# Patient Record
Sex: FEMALE | Race: WHITE | NOT HISPANIC OR LATINO | Employment: UNEMPLOYED | URBAN - NONMETROPOLITAN AREA
[De-identification: names, ages, dates, MRNs, and addresses within clinical notes are randomized per-mention and may not be internally consistent; named-entity substitution may affect disease eponyms.]

---

## 2017-09-21 ENCOUNTER — OFFICE VISIT (OUTPATIENT)
Dept: CARDIOLOGY | Facility: CLINIC | Age: 47
End: 2017-09-21

## 2017-09-21 VITALS
DIASTOLIC BLOOD PRESSURE: 72 MMHG | BODY MASS INDEX: 55.32 KG/M2 | OXYGEN SATURATION: 99 % | HEART RATE: 77 BPM | HEIGHT: 61 IN | WEIGHT: 293 LBS | SYSTOLIC BLOOD PRESSURE: 143 MMHG

## 2017-09-21 DIAGNOSIS — R00.2 PALPITATIONS: ICD-10-CM

## 2017-09-21 DIAGNOSIS — R06.02 SHORTNESS OF BREATH: ICD-10-CM

## 2017-09-21 DIAGNOSIS — R07.9 CHEST PAIN, UNSPECIFIED TYPE: Primary | ICD-10-CM

## 2017-09-21 DIAGNOSIS — I10 ESSENTIAL HYPERTENSION: ICD-10-CM

## 2017-09-21 DIAGNOSIS — R60.9 PERIPHERAL EDEMA: ICD-10-CM

## 2017-09-21 PROBLEM — G62.9 NEUROPATHY: Status: ACTIVE | Noted: 2017-09-21

## 2017-09-21 PROBLEM — M79.7 FIBROMYALGIA: Status: ACTIVE | Noted: 2017-09-21

## 2017-09-21 PROBLEM — J44.9 COPD (CHRONIC OBSTRUCTIVE PULMONARY DISEASE): Status: ACTIVE | Noted: 2017-09-21

## 2017-09-21 PROBLEM — R60.0 PERIPHERAL EDEMA: Status: ACTIVE | Noted: 2017-09-21

## 2017-09-21 PROCEDURE — 99204 OFFICE O/P NEW MOD 45 MIN: CPT | Performed by: NURSE PRACTITIONER

## 2017-09-21 PROCEDURE — 93000 ELECTROCARDIOGRAM COMPLETE: CPT | Performed by: NURSE PRACTITIONER

## 2017-09-21 RX ORDER — MELOXICAM 15 MG/1
15 TABLET ORAL DAILY
COMMUNITY
End: 2019-05-14

## 2017-09-21 RX ORDER — GABAPENTIN 600 MG/1
600 TABLET ORAL 3 TIMES DAILY
COMMUNITY
End: 2023-02-02 | Stop reason: ALTCHOICE

## 2017-09-21 RX ORDER — BUDESONIDE AND FORMOTEROL FUMARATE DIHYDRATE 160; 4.5 UG/1; UG/1
2 AEROSOL RESPIRATORY (INHALATION)
COMMUNITY

## 2017-09-21 RX ORDER — POTASSIUM CHLORIDE 750 MG/1
10 TABLET, FILM COATED, EXTENDED RELEASE ORAL DAILY
COMMUNITY
End: 2019-05-14

## 2017-09-21 RX ORDER — HYDROCODONE BITARTRATE AND ACETAMINOPHEN 10; 325 MG/1; MG/1
TABLET ORAL
Refills: 0 | COMMUNITY
Start: 2017-06-19 | End: 2023-02-02

## 2017-09-21 RX ORDER — RANITIDINE 150 MG/1
150 TABLET ORAL NIGHTLY
COMMUNITY

## 2017-09-21 RX ORDER — FUROSEMIDE 20 MG/1
20 TABLET ORAL
COMMUNITY
End: 2019-05-14

## 2017-09-21 RX ORDER — NITROGLYCERIN 0.4 MG/1
TABLET SUBLINGUAL
Qty: 30 TABLET | Refills: 3 | Status: SHIPPED | OUTPATIENT
Start: 2017-09-21 | End: 2019-05-14 | Stop reason: SDUPTHER

## 2017-09-21 RX ORDER — ALBUTEROL SULFATE 90 UG/1
2 AEROSOL, METERED RESPIRATORY (INHALATION) EVERY 4 HOURS PRN
COMMUNITY

## 2017-09-21 NOTE — PROGRESS NOTES
Subjective   Sofi Lazaro is a 47 y.o. female     Chief Complaint   Patient presents with   • Chest Pain     patient presents as a new patient   • Shortness of Breath       HPI    Problem list:    1.  Chest pain  2.  Shortness of breath  3.  Hypertension  4.  Peripheral edema  5.  Palpitations  6.  Fibromyalgia  7.  COPD-Dr. Lazo  8.  Peripheral neuropathy    Patient is a 47-year-old female who presents today to establish care for chest pain.  She says for about a couple months she has been having midsternum heaviness/pressure that will radiate up into her neck and between her shoulders.  She says that this can occur at any time.  She says it last a few minutes when it does happen.  She says she will get short of breath, nauseated and lightheaded.  She says she has palpitations which is a jittery feeling that occurs pretty much every day and is separate from her chest pain.  She says she's been having these for 6 months.  She denies any dizziness, presyncope, syncope or PND.  She says she does have orthopnea which she sees Dr. Lazo and has been negative for sleep apnea.  She does get swelling in her legs if she is on the long time or if she is riding in the truck with her boyfriend who is a .  She says for about a year now she has been short of breath with any activity that she does.     Occupation: Disabled nurse's aide  Quit smoking 15 years ago however smoked 1-1/2 packs a day for 10 years; denies alcohol and illicit drugs  Caffeine: 24 ounces a sweet tea a day and one cup of coffee denies any soda    Mom 68 -ovarian cancer, emphysema, heart disease  Dad 79 -MI  Brother 50 alive-CABG  Brother 58 alive-heart problems  Brother 48 -overdose    Current Outpatient Prescriptions   Medication Sig Dispense Refill   • albuterol (PROVENTIL HFA;VENTOLIN HFA) 108 (90 Base) MCG/ACT inhaler Inhale 2 puffs Every 4 (Four) Hours As Needed for Wheezing.     • budesonide-formoterol  (SYMBICORT) 160-4.5 MCG/ACT inhaler Inhale 2 puffs 2 (Two) Times a Day.     • furosemide (LASIX) 20 MG tablet Take 20 mg by mouth. Take 1-2 tabs in the morning and 2 tabs in the evening     • gabapentin (NEURONTIN) 600 MG tablet Take 600 mg by mouth 3 (Three) Times a Day.     • meloxicam (MOBIC) 15 MG tablet Take 15 mg by mouth Daily.     • potassium chloride (K-DUR) 10 MEQ CR tablet Take 10 mEq by mouth Daily.     • Pramipexole Dihydrochloride (MIRAPEX ER PO) Take  by mouth.     • raNITIdine (ZANTAC) 150 MG tablet Take 150 mg by mouth Every Night.     • aspirin 81 MG tablet Take 1 tablet by mouth Daily. 30 tablet 11   • HYDROcodone-acetaminophen (NORCO)  MG per tablet TK 1 T PO Q 4 H PRN FOR PAIN  0   • nitroglycerin (NITROSTAT) 0.4 MG SL tablet 1 under the tongue as needed for angina, may repeat q5mins for up three doses 30 tablet 3     No current facility-administered medications for this visit.        ALLERGIES    Codeine    History reviewed. No pertinent past medical history.    Social History     Social History   • Marital status:      Spouse name: N/A   • Number of children: N/A   • Years of education: N/A     Occupational History   • Not on file.     Social History Main Topics   • Smoking status: Former Smoker   • Smokeless tobacco: Never Used   • Alcohol use No   • Drug use: No   • Sexual activity: Defer     Other Topics Concern   • Not on file     Social History Narrative   • No narrative on file       Family History   Problem Relation Age of Onset   • No Known Problems Mother    • No Known Problems Father        Review of Systems   Constitutional: Positive for fatigue (6 mos now; use to walk, no longer has the energy ). Negative for diaphoresis.   HENT: Positive for rhinorrhea and sneezing.    Eyes: Negative for visual disturbance.   Respiratory: Positive for shortness of breath (with CP and with any activity at all for 1 yr ). Negative for apnea (tested by Dr. Lazo negative ) and chest  "tightness.    Cardiovascular: Positive for chest pain (midsternum heavy/pressure radiates into her neck and shoulders; been having for a couple of mos, maybe 1-2 x a week ), palpitations (also feels jittery 6 mos; happens freq outside of CP ) and leg swelling (every day; worse if she is on her feet more and riding in long distance truck with boyfriend ).   Gastrointestinal: Positive for constipation and nausea (with CP ). Negative for vomiting.   Endocrine: Negative.    Genitourinary: Positive for difficulty urinating.   Musculoskeletal: Positive for back pain and myalgias. Negative for arthralgias.   Skin: Negative.    Allergic/Immunologic: Positive for environmental allergies.   Neurological: Positive for light-headedness (with CP ) and headaches. Negative for dizziness and syncope.   Hematological: Negative.    Psychiatric/Behavioral: Negative.        Objective   /72 (BP Location: Left arm, Patient Position: Sitting)  Pulse 77  Ht 61\" (154.9 cm)  Wt (!) 302 lb (137 kg)  SpO2 99%  BMI 57.06 kg/m2  Vitals:    09/21/17 0859   BP: 143/72   BP Location: Left arm   Patient Position: Sitting   Pulse: 77   SpO2: 99%   Weight: (!) 302 lb (137 kg)   Height: 61\" (154.9 cm)      Lab Results (most recent)     None        Physical Exam   Constitutional: She is oriented to person, place, and time. Vital signs are normal. She appears well-developed and well-nourished. She is active and cooperative.   HENT:   Head: Normocephalic.   Mouth/Throat: Abnormal dentition (missing teeth ).   Eyes: Lids are normal.   Neck: Normal carotid pulses, no hepatojugular reflux and no JVD present. Carotid bruit is not present.   Cardiovascular: Normal rate, regular rhythm and normal heart sounds.    Pulses:       Radial pulses are 2+ on the right side, and 2+ on the left side.        Dorsalis pedis pulses are 2+ on the right side, and 2+ on the left side.        Posterior tibial pulses are 2+ on the right side, and 2+ on the left side. "   Trace edema BLE.    Pulmonary/Chest: Effort normal and breath sounds normal.   Abdominal: Normal appearance and bowel sounds are normal.   Neurological: She is alert and oriented to person, place, and time.   Skin: Skin is warm, dry and intact.   Psychiatric: She has a normal mood and affect. Her speech is normal and behavior is normal. Judgment and thought content normal. Cognition and memory are normal.       Procedure     ECG 12 Lead  Date/Time: 9/21/2017 9:20 AM  Performed by: GIA SIGALA  Authorized by: GIA SIGALA   Comparison: not compared with previous ECG   Rhythm: sinus rhythm  Rate: normal  BPM: 70  QRS axis: right                   Assessment/Plan      Diagnosis Plan   1. Chest pain, unspecified type  aspirin 81 MG tablet    nitroglycerin (NITROSTAT) 0.4 MG SL tablet    Stress Test With Myocardial Perfusion One Day    Adult Transthoracic Echo Complete W/ Cont if Necessary Per Protocol   2. Palpitations  Stress Test With Myocardial Perfusion One Day    Adult Transthoracic Echo Complete W/ Cont if Necessary Per Protocol    Cardiac Event Monitor   3. Shortness of breath  Stress Test With Myocardial Perfusion One Day    Adult Transthoracic Echo Complete W/ Cont if Necessary Per Protocol   4. Essential hypertension     5. Peripheral edema         Return in about 6 weeks (around 11/2/2017).       chest pain/palpitations/shortness of breath/hypertension-patient will have ischemia workup, stress and echo.  She will continue her medication regimen however she will also start 81 mg of aspirin a day coated.  She will use nitroglycerin when necessary for chest pain no resolution she will go to the ER.  She will wear an event monitor for 2 weeks.  She will take blood pressure and heart rate and bring these findings to her follow-up appointment.  She will follow-up in 6 weeks or sooner if any changes.    Called PCP at [phone] and requested labs to be faxed.

## 2017-09-21 NOTE — PATIENT INSTRUCTIONS
Palpitations  A palpitation is the feeling that your heartbeat is irregular or is faster than normal. It may feel like your heart is fluttering or skipping a beat. Palpitations are usually not a serious problem. They may be caused by many things, including smoking, caffeine, alcohol, stress, and certain medicines. Although most causes of palpitations are not serious, palpitations can be a sign of a serious medical problem. In some cases, you may need further medical evaluation.  HOME CARE INSTRUCTIONS  Pay attention to any changes in your symptoms. Take these actions to help with your condition:  · Avoid the following:    Caffeinated coffee, tea, soft drinks, diet pills, and energy drinks.    Chocolate.    Alcohol.  · Do not use any tobacco products, such as cigarettes, chewing tobacco, and e-cigarettes. If you need help quitting, ask your health care provider.  · Try to reduce your stress and anxiety. Things that can help you relax include:    Yoga.    Meditation.    Physical activity, such as swimming, jogging, or walking.    Biofeedback. This is a method that helps you learn to use your mind to control things in your body, such as your heartbeats.  · Get plenty of rest and sleep.  · Take over-the-counter and prescription medicines only as told by your health care provider.  · Keep all follow-up visits as told by your health care provider. This is important.  SEEK MEDICAL CARE IF:  · You continue to have a fast or irregular heartbeat after 24 hours.  · Your palpitations occur more often.  SEEK IMMEDIATE MEDICAL CARE IF:  · You have chest pain or shortness of breath.  · You have a severe headache.  · You feel dizzy or you faint.     This information is not intended to replace advice given to you by your health care provider. Make sure you discuss any questions you have with your health care provider.     Document Released: 12/15/2001 Document Revised: 04/10/2017 Document Reviewed: 09/01/2016  [email or web address]  Patient Education ©2017 [employer]  Nonspecific Chest Pain   Chest pain can be caused by many different conditions. There is always a chance that your pain could be related to something serious, such as a heart attack or a blood clot in your lungs. Chest pain can also be caused by conditions that are not life-threatening. If you have chest pain, it is very important to follow up with your health care provider.  CAUSES   Chest pain can be caused by:  · Heartburn.  · Pneumonia or bronchitis.  · Anxiety or stress.  · Inflammation around your heart (pericarditis) or lung (pleuritis or pleurisy).  · A blood clot in your lung.  · A collapsed lung (pneumothorax). It can develop suddenly on its own (spontaneous pneumothorax) or from trauma to the chest.  · Shingles infection (varicella-zoster virus).  · Heart attack.  · Damage to the bones, muscles, and cartilage that make up your chest wall. This can include:    Bruised bones due to injury.    Strained muscles or cartilage due to frequent or repeated coughing or overwork.    Fracture to one or more ribs.    Sore cartilage due to inflammation (costochondritis).  RISK FACTORS   Risk factors for chest pain may include:  · Activities that increase your risk for trauma or injury to your chest.  · Respiratory infections or conditions that cause frequent coughing.  · Medical conditions or overeating that can cause heartburn.  · Heart disease or family history of heart disease.  · Conditions or health behaviors that increase your risk of developing a blood clot.  · Having had chicken pox (varicella zoster).  SIGNS AND SYMPTOMS  Chest pain can feel like:  · Burning or tingling on the surface of your chest or deep in your chest.  · Crushing, pressure, aching, or squeezing pain.  · Dull or sharp pain that is worse when you move, cough, or take a deep breath.  · Pain that is also felt in your back, neck, shoulder, or arm, or pain that spreads to any of these areas.  Your chest pain  may come and go, or it may stay constant.  DIAGNOSIS  Lab tests or other studies may be needed to find the cause of your pain. Your health care provider may have you take a test called an ambulatory ECG (electrocardiogram). An ECG records your heartbeat patterns at the time the test is performed. You may also have other tests, such as:  · Transthoracic echocardiogram (TTE). During echocardiography, sound waves are used to create a picture of all of the heart structures and to look at how blood flows through your heart.  · Transesophageal echocardiogram (SAI). This is a more advanced imaging test that obtains images from inside your body. It allows your health care provider to see your heart in finer detail.  · Cardiac monitoring. This allows your health care provider to monitor your heart rate and rhythm in real time.  · Holter monitor. This is a portable device that records your heartbeat and can help to diagnose abnormal heartbeats. It allows your health care provider to track your heart activity for several days, if needed.  · Stress tests. These can be done through exercise or by taking medicine that makes your heart beat more quickly.  · Blood tests.  · Imaging tests.  TREATMENT   Your treatment depends on what is causing your chest pain. Treatment may include:  · Medicines. These may include:    Acid blockers for heartburn.    Anti-inflammatory medicine.    Pain medicine for inflammatory conditions.    Antibiotic medicine, if an infection is present.    Medicines to dissolve blood clots.    Medicines to treat coronary artery disease.  · Supportive care for conditions that do not require medicines. This may include:    Resting.    Applying heat or cold packs to injured areas.    Limiting activities until pain decreases.  HOME CARE INSTRUCTIONS  · If you were prescribed an antibiotic medicine, finish it all even if you start to feel better.  · Avoid any activities that bring on chest pain.  · Do not use any  tobacco products, including cigarettes, chewing tobacco, or electronic cigarettes. If you need help quitting, ask your health care provider.  · Do not drink alcohol.  · Take medicines only as directed by your health care provider.  · Keep all follow-up visits as directed by your health care provider. This is important. This includes any further testing if your chest pain does not go away.  · If heartburn is the cause for your chest pain, you may be told to keep your head raised (elevated) while sleeping. This reduces the chance that acid will go from your stomach into your esophagus.  · Make lifestyle changes as directed by your health care provider. These may include:    Getting regular exercise. Ask your health care provider to suggest some activities that are safe for you.    Eating a heart-healthy diet. A registered dietitian can help you to learn healthy eating options.    Maintaining a healthy weight.    Managing diabetes, if necessary.    Reducing stress.  SEEK MEDICAL CARE IF:  · Your chest pain does not go away after treatment.  · You have a rash with blisters on your chest.  · You have a fever.  SEEK IMMEDIATE MEDICAL CARE IF:   · Your chest pain is worse.  · You have an increasing cough, or you cough up blood.  · You have severe abdominal pain.  · You have severe weakness.  · You faint.  · You have chills.  · You have sudden, unexplained chest discomfort.  · You have sudden, unexplained discomfort in your arms, back, neck, or jaw.  · You have shortness of breath at any time.  · You suddenly start to sweat, or your skin gets clammy.  · You feel nauseous or you vomit.  · You suddenly feel light-headed or dizzy.  · Your heart begins to beat quickly, or it feels like it is skipping beats.  These symptoms may represent a serious problem that is an emergency. Do not wait to see if the symptoms will go away. Get medical help right away. Call your local emergency services (911 in the [place] Do not drive yourself  to the hospital.     This information is not intended to replace advice given to you by your health care provider. Make sure you discuss any questions you have with your health care provider.     Document Released: 09/27/2006 Document Revised: 01/08/2016 Document Reviewed: 07/24/2015  [employer] Interactive Patient Education ©2017 [employer]    Edema  Edema is an abnormal buildup of fluids in your body tissues. Edema is somewhat dependent on gravity to pull the fluid to the lowest place in your body. That makes the condition more common in the legs and thighs (lower extremities). Painless swelling of the feet and ankles is common and becomes more likely as you get older. It is also common in looser tissues, like around your eyes.   When the affected area is squeezed, the fluid may move out of that spot and leave a dent for a few moments. This dent is called pitting.   CAUSES   There are many possible causes of edema. Eating too much salt and being on your feet or sitting for a long time can cause edema in your legs and ankles. Hot weather may make edema worse. Common medical causes of edema include:  · Heart failure.  · Liver disease.  · Kidney disease.  · Weak blood vessels in your legs.  · Cancer.  · An injury.  · Pregnancy.  · Some medications.  · Obesity.   SYMPTOMS   Edema is usually painless. Your skin may look swollen or shiny.   DIAGNOSIS   Your health care provider may be able to diagnose edema by asking about your medical history and doing a physical exam. You may need to have tests such as X-rays, an electrocardiogram, or blood tests to check for medical conditions that may cause edema.   TREATMENT   Edema treatment depends on the cause. If you have heart, liver, or kidney disease, you need the treatment appropriate for these conditions. General treatment may include:  · Elevation of the affected body part above the level of your heart.  · Compression of the affected body part. Pressure from elastic  bandages or support stockings squeezes the tissues and forces fluid back into the blood vessels. This keeps fluid from entering the tissues.  · Restriction of fluid and salt intake.  · Use of a water pill (diuretic). These medications are appropriate only for some types of edema. They pull fluid out of your body and make you urinate more often. This gets rid of fluid and reduces swelling, but diuretics can have side effects. Only use diuretics as directed by your health care provider.  HOME CARE INSTRUCTIONS   · Keep the affected body part above the level of your heart when you are lying down.    · Do not sit still or stand for prolonged periods.    · Do not put anything directly under your knees when lying down.  · Do not wear constricting clothing or garters on your upper legs.    · Exercise your legs to work the fluid back into your blood vessels. This may help the swelling go down.    · Wear elastic bandages or support stockings to reduce ankle swelling as directed by your health care provider.    · Eat a low-salt diet to reduce fluid if your health care provider recommends it.    · Only take medicines as directed by your health care provider.   SEEK MEDICAL CARE IF:   · Your edema is not responding to treatment.  · You have heart, liver, or kidney disease and notice symptoms of edema.  · You have edema in your legs that does not improve after elevating them.    · You have sudden and unexplained weight gain.  SEEK IMMEDIATE MEDICAL CARE IF:   · You develop shortness of breath or chest pain.    · You cannot breathe when you lie down.  · You develop pain, redness, or warmth in the swollen areas.    · You have heart, liver, or kidney disease and suddenly get edema.  · You have a fever and your symptoms suddenly get worse.  MAKE SURE YOU:   · Understand these instructions.  · Will watch your condition.  · Will get help right away if you are not doing well or get worse.     This information is not intended to replace  advice given to you by your health care provider. Make sure you discuss any questions you have with your health care provider.     Document Released: 12/18/2006 Document Revised: 04/10/2017 Document Reviewed: 10/10/2014  [employer] Interactive Patient Education ©2017 [employer]

## 2019-05-14 ENCOUNTER — OFFICE VISIT (OUTPATIENT)
Dept: CARDIOLOGY | Facility: CLINIC | Age: 49
End: 2019-05-14

## 2019-05-14 VITALS
DIASTOLIC BLOOD PRESSURE: 74 MMHG | BODY MASS INDEX: 55.32 KG/M2 | HEIGHT: 61 IN | HEART RATE: 72 BPM | OXYGEN SATURATION: 98 % | WEIGHT: 293 LBS | SYSTOLIC BLOOD PRESSURE: 122 MMHG

## 2019-05-14 DIAGNOSIS — R06.02 SHORTNESS OF BREATH: ICD-10-CM

## 2019-05-14 DIAGNOSIS — I10 ESSENTIAL HYPERTENSION: ICD-10-CM

## 2019-05-14 DIAGNOSIS — R60.9 PERIPHERAL EDEMA: ICD-10-CM

## 2019-05-14 DIAGNOSIS — R00.2 PALPITATIONS: ICD-10-CM

## 2019-05-14 DIAGNOSIS — R07.9 CHEST PAIN, UNSPECIFIED TYPE: Primary | ICD-10-CM

## 2019-05-14 DIAGNOSIS — E78.5 HYPERLIPIDEMIA, UNSPECIFIED HYPERLIPIDEMIA TYPE: ICD-10-CM

## 2019-05-14 PROCEDURE — 93000 ELECTROCARDIOGRAM COMPLETE: CPT | Performed by: NURSE PRACTITIONER

## 2019-05-14 PROCEDURE — 99214 OFFICE O/P EST MOD 30 MIN: CPT | Performed by: NURSE PRACTITIONER

## 2019-05-14 RX ORDER — LISINOPRIL 40 MG/1
40 TABLET ORAL DAILY
COMMUNITY
Start: 2019-05-13 | End: 2020-08-03 | Stop reason: SDUPTHER

## 2019-05-14 RX ORDER — AMITRIPTYLINE HYDROCHLORIDE 100 MG/1
100 TABLET, FILM COATED ORAL DAILY
COMMUNITY
Start: 2019-05-13 | End: 2023-02-02

## 2019-05-14 RX ORDER — NITROGLYCERIN 0.4 MG/1
TABLET SUBLINGUAL
Qty: 30 TABLET | Refills: 3 | Status: SHIPPED | OUTPATIENT
Start: 2019-05-14 | End: 2023-02-23 | Stop reason: SDUPTHER

## 2019-05-14 RX ORDER — BUSPIRONE HYDROCHLORIDE 10 MG/1
10 TABLET ORAL DAILY
COMMUNITY
Start: 2019-05-13 | End: 2023-02-02

## 2019-05-14 NOTE — PROGRESS NOTES
Subjective   Sofi Lazaro is a 49 y.o. female     Chief Complaint   Patient presents with   • Follow-up     Here for a follow up        HPI    Problem list:    1.  Chest pain  2.  Shortness of breath  3.  Hypertension  4.  Peripheral edema  5.  Palpitations  6.  Fibromyalgia  7.  COPD-Dr. Lazo  8.  Peripheral neuropathy  9.  Hyperlipidemia    Patient is a 49-year-old female who presents today for follow-up.  Patient previously here but did not have any of her testing done 2 years ago.  She is currently having left-sided what she considers achy/stabbing pain.  She says it does not radiate but it can occur anytime.  She says it usually last 15 to 30 minutes.  She says she will lay down when it happens to see if it will go away.  She says that it can be anywhere from once a month to 2-3 times a month.  She says it just varies.  She says she does get short of breath, diaphoretic, nauseated and lightheaded when it occurs.  She says she has skipping that usually occurs at night at rest.  She denies any dizziness, presyncope, syncope, orthopnea or PND.  She says she does have swelling in her legs but it goes down overnight.  She says she has shortness of breath with any activity and she is been this way for at least a year and also with fatigue.  She says she gets more more tired every day.  Patient does see Dr. Lazo for COPD and says she was tested for sleep apnea but found to be negative.  Patient does have a brother who still alive but is 58 years old and he has had stents as well as cardiomyopathy    We went over her labs from her PCP.        Current Outpatient Medications   Medication Sig Dispense Refill   • albuterol (PROVENTIL HFA;VENTOLIN HFA) 108 (90 Base) MCG/ACT inhaler Inhale 2 puffs Every 4 (Four) Hours As Needed for Wheezing.     • amitriptyline (ELAVIL) 100 MG tablet Take 100 mg by mouth Daily.     • ANORO ELLIPTA 62.5-25 MCG/INH aerosol powder  inhaler      • aspirin 81 MG tablet Take 1 tablet by mouth  Daily. 30 tablet 11   • budesonide-formoterol (SYMBICORT) 160-4.5 MCG/ACT inhaler Inhale 2 puffs 2 (Two) Times a Day.     • busPIRone (BUSPAR) 10 MG tablet Take 10 mg by mouth Daily.     • gabapentin (NEURONTIN) 800 MG tablet Take 600 mg by mouth 3 (Three) Times a Day.     • HYDROcodone-acetaminophen (NORCO)  MG per tablet TK 1 T PO Q 4 H PRN FOR PAIN  0   • lisinopril (PRINIVIL,ZESTRIL) 40 MG tablet Take 40 mg by mouth Daily.     • Pramipexole Dihydrochloride (MIRAPEX ER PO) Take  by mouth.     • raNITIdine (ZANTAC) 150 MG tablet Take 150 mg by mouth Every Night.     • nitroglycerin (NITROSTAT) 0.4 MG SL tablet 1 under the tongue as needed for angina, may repeat q5mins for up three doses 30 tablet 3     No current facility-administered medications for this visit.        ALLERGIES    Codeine    Past Medical History:   Diagnosis Date   • Anxiety    • Chronic pain syndrome    • COPD (chronic obstructive pulmonary disease) (CMS/[facility])    • Depressive disorder    • Enlarged heart    • Fibromyalgia    • GERD (gastroesophageal reflux disease)    • Hypertension    • IBS (irritable bowel syndrome)    • Insomnia    • RLS (restless legs syndrome)        Social History     Socioeconomic History   • Marital status:      Spouse name: Not on file   • Number of children: Not on file   • Years of education: Not on file   • Highest education level: Not on file   Tobacco Use   • Smoking status: Former Smoker   • Smokeless tobacco: Never Used   Substance and Sexual Activity   • Alcohol use: No   • Drug use: No   • Sexual activity: Defer       Family History   Problem Relation Age of Onset   • Seizures Mother    • COPD Mother    • Cancer Mother    • COPD Father        Review of Systems   Constitutional: Positive for diaphoresis (night sweats- wakes up and skin is damp; with CP ) and fatigue (no get up and go for about 1 yr, gets more and more tired every day ).   HENT: Negative for congestion, rhinorrhea and sore throat.   "  Eyes: Negative for visual disturbance.   Respiratory: Positive for shortness of breath (w/any activity for at least 1 yr ). Negative for chest tightness.    Cardiovascular: Positive for chest pain (left sided, achey/stabbing pain. Increased SoA, gets hot, non-radiating; occurs at anytime, 15-30 min; usually lays down; can be anywhere from 1 x a month and then can be 2-3 x varies), palpitations (skipping usually occurs at night with rest ) and leg swelling (BLE edema; goes down overnight ).   Gastrointestinal: Positive for constipation (back and forth ), diarrhea (back and forth ) and nausea (sometimes with CP ). Negative for abdominal pain, blood in stool and vomiting.        IBS   Endocrine: Positive for cold intolerance (\"always cold\" ). Negative for heat intolerance.   Genitourinary: Negative for difficulty urinating, dysuria, frequency, hematuria and urgency.   Musculoskeletal: Positive for back pain (lower back ). Negative for arthralgias and neck pain.   Skin: Negative for rash and wound.   Allergic/Immunologic: Negative for environmental allergies and food allergies.   Neurological: Positive for light-headedness (random and with CP ) and numbness (right hand and arm when she has chest pain). Negative for dizziness, syncope, weakness and headaches.   Hematological: Bruises/bleeds easily (both ).   Psychiatric/Behavioral: Positive for sleep disturbance (Issues falling asleep and staying asleep, denies SoA at HS. Insomnia ). The patient is nervous/anxious.        Objective   /74 (BP Location: Left arm, Patient Position: Sitting)   Pulse 72   Ht 154.9 cm (61\")   Wt (!) 143 kg (314 lb 12.8 oz)   SpO2 98%   BMI 59.48 kg/m²   Vitals:    05/14/19 1124   BP: 122/74   BP Location: Left arm   Patient Position: Sitting   Pulse: 72   SpO2: 98%   Weight: (!) 143 kg (314 lb 12.8 oz)   Height: 154.9 cm (61\")      Lab Results (most recent)     None        Physical Exam   Constitutional: She is oriented to person, " place, and time. Vital signs are normal. She appears well-developed and well-nourished. She is active and cooperative.   HENT:   Head: Normocephalic.   Eyes: Lids are normal.   Neck: Normal carotid pulses, no hepatojugular reflux and no JVD present. Carotid bruit is not present.   Cardiovascular: Normal rate, regular rhythm and normal heart sounds.   Pulses:       Radial pulses are 2+ on the right side, and 2+ on the left side.        Dorsalis pedis pulses are 2+ on the right side, and 2+ on the left side.        Posterior tibial pulses are 2+ on the right side, and 2+ on the left side.   No edema BLE.   Pulmonary/Chest: Effort normal and breath sounds normal.   Abdominal: Normal appearance and bowel sounds are normal.   Neurological: She is alert and oriented to person, place, and time.   Skin: Skin is warm, dry and intact.   Psychiatric: She has a normal mood and affect. Her speech is normal and behavior is normal. Judgment and thought content normal. Cognition and memory are normal.       Procedure     ECG 12 Lead  Date/Time: 5/14/2019 4:38 PM  Performed by: Priscila Espinosa APRN  Authorized by: Priscila Espinosa APRN   Comparison: not compared with previous ECG   Rhythm: sinus rhythm  Rate: normal  BPM: 64  QRS axis: normal  Other findings: low voltage and T wave abnormality    Clinical impression: non-specific ECG                 Assessment/Plan      Diagnosis Plan   1. Chest pain, unspecified type  nitroglycerin (NITROSTAT) 0.4 MG SL tablet    Stress Test With Myocardial Perfusion One Day    Adult Transthoracic Echo Complete W/ Cont if Necessary Per Protocol   2. Essential hypertension  Stress Test With Myocardial Perfusion One Day    Adult Transthoracic Echo Complete W/ Cont if Necessary Per Protocol   3. Palpitations  Stress Test With Myocardial Perfusion One Day    Adult Transthoracic Echo Complete W/ Cont if Necessary Per Protocol    Cardiac Event Monitor   4. Shortness of breath  Stress Test With  Myocardial Perfusion One Day    Adult Transthoracic Echo Complete W/ Cont if Necessary Per Protocol   5. Peripheral edema  Stress Test With Myocardial Perfusion One Day    Adult Transthoracic Echo Complete W/ Cont if Necessary Per Protocol   6. Hyperlipidemia, unspecified hyperlipidemia type         Return in about 12 weeks (around 8/6/2019).    Chest pain/hypertension/palpation/shortness of breath/peripheral edema/dyslipidemia-patient will have ischemia work-up, stress and echo.  She will use nitro.  For chest pain no resolution she will go to the ER.  She will wear an event monitor for 2 weeks.  She will continue her medication regimen otherwise.  She will follow-up in 12 weeks or sooner if any changes.  I did advise to putting on what her testing shows we may have to start her on a statin.         Patient's Body mass index is 59.48 kg/m². BMI is above normal parameters. Recommendations include: educational material.      Electronically signed by:

## 2019-05-14 NOTE — PATIENT INSTRUCTIONS
"Fat and Cholesterol Restricted Eating Plan  Getting too much fat and cholesterol in your diet may cause health problems. Choosing the right foods helps keep your fat and cholesterol at normal levels. This can keep you from getting certain diseases.  Your doctor may recommend an eating plan that includes:  · Total fat: ______% or less of total calories a day.  · Saturated fat: ______% or less of total calories a day.  · Cholesterol: less than _________mg a day.  · Fiber: ______g a day.    What are tips for following this plan?  General tips  · Work with your doctor to lose weight if you need to.  · Avoid:  ? Foods with added sugar.  ? Fried foods.  ? Foods with partially hydrogenated oils.  · Limit alcohol intake to no more than 1 drink a day for nonpregnant women and 2 drinks a day for men. One drink equals 12 oz of beer, 5 oz of wine, or 1½ oz of hard liquor.  Reading food labels  · Check food labels for:  ? Trans fats.  ? Partially hydrogenated oils.  ? Saturated fat (g) in each serving.  ? Cholesterol (mg) in each serving.  ? Fiber (g) in each serving.  · Choose foods with healthy fats, such as:  ? Monounsaturated fats.  ? Polyunsaturated fats.  ? Omega-3 fats.  · Choose grain products that have whole grains. Look for the word \"whole\" as the first word in the ingredient list.  Cooking  · Cook foods using low-fat methods. These include baking, boiling, grilling, and broiling.  · Eat more home-cooked foods. Eat at restaurants and buffets less often.  · Avoid cooking using saturated fats, such as butter, cream, palm oil, palm kernel oil, and coconut oil.  Meal planning  · At meals, divide your plate into four equal parts:  ? Fill one-half of your plate with vegetables and green salads.  ? Fill one-fourth of your plate with whole grains.  ? Fill one-fourth of your plate with low-fat (lean) protein foods.  · Eat fish that is high in omega-3 fats at least two times a week. This includes mackerel, tuna, sardines, and " salmon.  · Eat foods that are high in fiber, such as whole grains, beans, apples, broccoli, carrots, peas, and barley.  Recommended foods  Grains  · Whole grains, such as whole wheat or whole grain breads, crackers, cereals, and pasta. Unsweetened oatmeal, bulgur, barley, quinoa, or brown rice. Corn or whole wheat flour tortillas.  Vegetables  · Fresh or frozen vegetables (raw, steamed, roasted, or grilled). Green salads.  Fruits  · All fresh, canned (in natural juice), or frozen fruits.  Meats and other protein foods  · Ground beef (85% or leaner), grass-fed beef, or beef trimmed of fat. Skinless chicken or turkey. Ground chicken or turkey. Pork trimmed of fat. All fish and seafood. Egg whites. Dried beans, peas, or lentils. Unsalted nuts or seeds. Unsalted canned beans. Nut butters without added sugar or oil.  Dairy  · Low-fat or nonfat dairy products, such as skim or 1% milk, 2% or reduced-fat cheeses, low-fat and fat-free ricotta or cottage cheese, or plain low-fat and nonfat yogurt.  Fats and oils  · Tub margarine without trans fats. Light or reduced-fat mayonnaise and salad dressings. Avocado. Olive, canola, sesame, or safflower oils.  The items listed above may not be a complete list of recommended foods or beverages. Contact your dietitian for more options.  Foods to avoid  Grains  · White bread. White pasta. White rice. Cornbread. Bagels, pastries, and croissants. Crackers and snack foods that contain trans fat and hydrogenated oils.  Vegetables  · Vegetables cooked in cheese, cream, or butter sauce. Fried vegetables.  Fruits  · Canned fruit in heavy syrup. Fruit in cream or butter sauce. Fried fruit.  Meats and other protein foods  · Fatty cuts of meat. Ribs, chicken wings, cruz, sausage, bologna, salami, chitterlings, fatback, hot dogs, bratwurst, and packaged lunch meats. Liver and organ meats. Whole eggs and egg yolks. Chicken and turkey with skin. Fried meat.  Dairy  · Whole or 2% milk, cream,  half-and-half, and cream cheese. Whole milk cheeses. Whole-fat or sweetened yogurt. Full-fat cheeses. Nondairy creamers and whipped toppings. Processed cheese, cheese spreads, and cheese curds.  Beverages  · Alcohol. Sugar-sweetened drinks such as sodas, lemonade, and fruit drinks.  Fats and oils  · Butter, stick margarine, lard, shortening, ghee, or cruz fat. Coconut, palm kernel, and palm oils.  Sweets and desserts  · Corn syrup, sugars, honey, and molasses. Candy. Jam and jelly. Syrup. Sweetened cereals. Cookies, pies, cakes, donuts, muffins, and ice cream.  The items listed above may not be a complete list of foods and beverages to avoid. Contact your dietitian for more information.  Summary  · Choosing the right foods helps keep your fat and cholesterol at normal levels. This can keep you from getting certain diseases.  · At meals, fill one-half of your plate with vegetables and green salads.  · Eat high-fiber foods, like whole grains, beans, apples, carrots, peas, and barley.  · Limit added sugar, saturated fats, alcohol, and fried foods.  This information is not intended to replace advice given to you by your health care provider. Make sure you discuss any questions you have with your health care provider.  Document Released: 06/18/2013 Document Revised: 09/04/2018 Document Reviewed: 09/04/2018  [employer] Interactive Patient Education © 2019 [employer]  BMI for Adults  Body mass index (BMI) is a number that is calculated from a person's weight and height. In most adults, the number is used to find how much of an adult's weight is made up of fat. BMI is not as accurate as a direct measure of body fat.  How is BMI calculated?  BMI is calculated by dividing weight in kilograms by height in meters squared. It can also be calculated by dividing weight in pounds by height in inches squared, then multiplying the resulting number by 703. Charts are available to help you find your BMI quickly and easily without  doing this calculation.  How is BMI interpreted?  Health care professionals use BMI charts to identify whether an adult is underweight, at a normal weight, or overweight based on the following guidelines:  · Underweight: BMI less than 18.5.  · Normal weight: BMI between 18.5 and 24.9.  · Overweight: BMI between 25 and 29.9.  · Obese: BMI of 30 and above.    BMI is usually interpreted the same for males and females.  Weight includes both fat and muscle, so someone with a muscular build, such as an athlete, may have a BMI that is higher than 24.9. In cases like these, BMI may not accurately depict body fat. To determine if excess body fat is the cause of a BMI of 25 or higher, further assessments may need to be done by a health care provider.  Why is BMI a useful tool?  BMI is used to identify a possible weight problem that may be related to a medical problem or may increase the risk for medical problems. BMI can also be used to promote changes to reach a healthy weight.  This information is not intended to replace advice given to you by your health care provider. Make sure you discuss any questions you have with your health care provider.  Document Released: 08/29/2005 Document Revised: 04/27/2017 Document Reviewed: 05/15/2015  [employer] Interactive Patient Education © 2018 [employer]    Nonspecific Chest Pain  Chest pain can be caused by many different conditions. There is a chance that your pain could be related to something serious, such as a heart attack or a blood clot in your lungs. Chest pain can also be caused by conditions that are not life-threatening. If you have chest pain, it is very important to follow up with your doctor.  Follow these instructions at home:  Medicines  · If you were prescribed an antibiotic medicine, take it as told by your doctor. Do not stop taking the antibiotic even if you start to feel better.  · Take over-the-counter and prescription medicines only as told by your  doctor.  Lifestyle  · Do not use any products that contain nicotine or tobacco, such as cigarettes and e-cigarettes. If you need help quitting, ask your doctor.  · Do not drink alcohol.  · Make lifestyle changes as told by your doctor. These may include:  ? Getting regular exercise. Ask your doctor for some activities that are safe for you.  ? Eating a heart-healthy diet. A diet specialist (dietitian) can help you to learn healthy eating options.  ? Staying at a healthy weight.  ? Managing diabetes, if needed.  ? Lowering your stress, as with deep breathing or spending time in nature.  General instructions  · Avoid any activities that make you feel chest pain.  · If your chest pain is because of heartburn:  ? Raise (elevate) the head of your bed about 6 inches (15 cm). You can do this by putting blocks under the bed legs at the head of the bed.  ? Do not sleep with extra pillows under your head. That does not help heartburn.  · Keep all follow-up visits as told by your doctor. This is important. This includes any further testing if your chest pain does not go away.  Contact a doctor if:  · Your chest pain does not go away.  · You have a rash with blisters on your chest.  · You have a fever.  · You have chills.  Get help right away if:  · Your chest pain is worse.  · You have a cough that gets worse, or you cough up blood.  · You have very bad (severe) pain in your belly (abdomen).  · You are very weak.  · You pass out (faint).  · You have either of these for no clear reason:  ? Sudden chest discomfort.  ? Sudden discomfort in your arms, back, neck, or jaw.  · You have shortness of breath at any time.  · You suddenly start to sweat, or your skin gets clammy.  · You feel sick to your stomach (nauseous).  · You throw up (vomit).  · You suddenly feel light-headed or dizzy.  · Your heart starts to beat fast, or it feels like it is skipping beats.  These symptoms may be an emergency. Do not wait to see if the symptoms  will go away. Get medical help right away. Call your local emergency services (911 in the [place] Do not drive yourself to the hospital.  This information is not intended to replace advice given to you by your health care provider. Make sure you discuss any questions you have with your health care provider.  Document Released: 06/05/2009 Document Revised: 09/11/2017 Document Reviewed: 09/11/2017  [employer] Interactive Patient Education © 2019 [employer]

## 2019-06-01 ENCOUNTER — OUTSIDE FACILITY SERVICE (OUTPATIENT)
Dept: CARDIOLOGY | Facility: CLINIC | Age: 49
End: 2019-06-01

## 2019-06-01 PROCEDURE — 93228 REMOTE 30 DAY ECG REV/REPORT: CPT | Performed by: INTERNAL MEDICINE

## 2019-06-04 ENCOUNTER — TELEPHONE (OUTPATIENT)
Dept: CARDIOLOGY | Facility: CLINIC | Age: 49
End: 2019-06-04

## 2019-06-04 RX ORDER — ISOSORBIDE MONONITRATE 30 MG/1
15 TABLET, EXTENDED RELEASE ORAL DAILY
Qty: 30 TABLET | Refills: 11 | Status: SHIPPED | OUTPATIENT
Start: 2019-06-04 | End: 2023-02-02

## 2019-06-04 NOTE — TELEPHONE ENCOUNTER
"Received pt's monitor results, baseline HR 82.7 BPM. 0 critical, 0 serious, and 4 stable events.     Per Priscila, keep follow up appt on 8/6/19.         Informed pt of the above message, she verbalized understanding. Informed pt that we will call when when we get her echo and stress reuslts and to keep follow up appt.     Pt c/o chest tightness/soreness between breasts. I asked pf if her GERd was having issues. Pt does describe it as a \"choking\" sensation. Per Priscila, sent in Isosorbide 30mg, 1/2 tab daily. Informed pt that it can cause HA for about 7 days, as her body adjusted to it and to call if she can't handle the HA. Pt verbalized understanding.              "

## 2019-08-06 ENCOUNTER — TELEPHONE (OUTPATIENT)
Dept: CARDIOLOGY | Facility: CLINIC | Age: 49
End: 2019-08-06

## 2019-08-06 NOTE — TELEPHONE ENCOUNTER
THE PRACTICE MANAGER CONTACTED THE PT TO DISCUSS THE NO SHOW POLICY AND CALLING OFFICE 24 HRS PRIOR TO CANCELLATIONS.  THE PT VERBALIZED UNDERSTANDING AND STATED SHE WILL CONTACT SCHEDULING TO RESCHEDULE TESTING APPT.  A MESSAGE SENT TO [facility] TO NOTIFY THEM OF THE RESCHEDULE REQUEST AS WELL.

## 2020-07-02 ENCOUNTER — OFFICE VISIT (OUTPATIENT)
Dept: CARDIOLOGY | Facility: CLINIC | Age: 50
End: 2020-07-02

## 2020-07-02 VITALS
DIASTOLIC BLOOD PRESSURE: 76 MMHG | SYSTOLIC BLOOD PRESSURE: 155 MMHG | HEIGHT: 61 IN | HEART RATE: 76 BPM | WEIGHT: 283.8 LBS | OXYGEN SATURATION: 97 % | BODY MASS INDEX: 53.58 KG/M2

## 2020-07-02 DIAGNOSIS — R00.2 PALPITATIONS: Primary | ICD-10-CM

## 2020-07-02 DIAGNOSIS — E78.5 HYPERLIPIDEMIA, UNSPECIFIED HYPERLIPIDEMIA TYPE: ICD-10-CM

## 2020-07-02 DIAGNOSIS — R07.9 CHEST PAIN, UNSPECIFIED TYPE: ICD-10-CM

## 2020-07-02 DIAGNOSIS — R06.02 SHORTNESS OF BREATH: ICD-10-CM

## 2020-07-02 DIAGNOSIS — I10 ESSENTIAL HYPERTENSION: ICD-10-CM

## 2020-07-02 PROCEDURE — 93000 ELECTROCARDIOGRAM COMPLETE: CPT | Performed by: PHYSICIAN ASSISTANT

## 2020-07-02 PROCEDURE — 99214 OFFICE O/P EST MOD 30 MIN: CPT | Performed by: PHYSICIAN ASSISTANT

## 2020-07-02 RX ORDER — HYDROCODONE BITARTRATE AND ACETAMINOPHEN 5; 325 MG/1; MG/1
TABLET ORAL
COMMUNITY
Start: 2020-04-07 | End: 2023-02-02 | Stop reason: ALTCHOICE

## 2020-07-02 RX ORDER — AMLODIPINE BESYLATE 5 MG/1
5 TABLET ORAL DAILY
Qty: 30 TABLET | Refills: 11 | Status: SHIPPED | OUTPATIENT
Start: 2020-07-02

## 2020-07-02 NOTE — PATIENT INSTRUCTIONS
"Fat and Cholesterol Restricted Eating Plan  Getting too much fat and cholesterol in your diet may cause health problems. Choosing the right foods helps keep your fat and cholesterol at normal levels. This can keep you from getting certain diseases.  Your doctor may recommend an eating plan that includes:  · Total fat: ______% or less of total calories a day.  · Saturated fat: ______% or less of total calories a day.  · Cholesterol: less than _________mg a day.  · Fiber: ______g a day.  What are tips for following this plan?  Meal planning  · At meals, divide your plate into four equal parts:  ? Fill one-half of your plate with vegetables and green salads.  ? Fill one-fourth of your plate with whole grains.  ? Fill one-fourth of your plate with low-fat (lean) protein foods.  · Eat fish that is high in omega-3 fats at least two times a week. This includes mackerel, tuna, sardines, and salmon.  · Eat foods that are high in fiber, such as whole grains, beans, apples, broccoli, carrots, peas, and barley.  General tips    · Work with your doctor to lose weight if you need to.  · Avoid:  ? Foods with added sugar.  ? Fried foods.  ? Foods with partially hydrogenated oils.  · Limit alcohol intake to no more than 1 drink a day for nonpregnant women and 2 drinks a day for men. One drink equals 12 oz of beer, 5 oz of wine, or 1½ oz of hard liquor.  Reading food labels  · Check food labels for:  ? Trans fats.  ? Partially hydrogenated oils.  ? Saturated fat (g) in each serving.  ? Cholesterol (mg) in each serving.  ? Fiber (g) in each serving.  · Choose foods with healthy fats, such as:  ? Monounsaturated fats.  ? Polyunsaturated fats.  ? Omega-3 fats.  · Choose grain products that have whole grains. Look for the word \"whole\" as the first word in the ingredient list.  Cooking  · Cook foods using low-fat methods. These include baking, boiling, grilling, and broiling.  · Eat more home-cooked foods. Eat at restaurants and buffets " less often.  · Avoid cooking using saturated fats, such as butter, cream, palm oil, palm kernel oil, and coconut oil.  Recommended foods    Fruits  · All fresh, canned (in natural juice), or frozen fruits.  Vegetables  · Fresh or frozen vegetables (raw, steamed, roasted, or grilled). Green salads.  Grains  · Whole grains, such as whole wheat or whole grain breads, crackers, cereals, and pasta. Unsweetened oatmeal, bulgur, barley, quinoa, or brown rice. Corn or whole wheat flour tortillas.  Meats and other protein foods  · Ground beef (85% or leaner), grass-fed beef, or beef trimmed of fat. Skinless chicken or turkey. Ground chicken or turkey. Pork trimmed of fat. All fish and seafood. Egg whites. Dried beans, peas, or lentils. Unsalted nuts or seeds. Unsalted canned beans. Nut butters without added sugar or oil.  Dairy  · Low-fat or nonfat dairy products, such as skim or 1% milk, 2% or reduced-fat cheeses, low-fat and fat-free ricotta or cottage cheese, or plain low-fat and nonfat yogurt.  Fats and oils  · Tub margarine without trans fats. Light or reduced-fat mayonnaise and salad dressings. Avocado. Olive, canola, sesame, or safflower oils.  The items listed above may not be a complete list of foods and beverages you can eat. Contact a dietitian for more information.  Foods to avoid  Fruits  · Canned fruit in heavy syrup. Fruit in cream or butter sauce. Fried fruit.  Vegetables  · Vegetables cooked in cheese, cream, or butter sauce. Fried vegetables.  Grains  · White bread. White pasta. White rice. Cornbread. Bagels, pastries, and croissants. Crackers and snack foods that contain trans fat and hydrogenated oils.  Meats and other protein foods  · Fatty cuts of meat. Ribs, chicken wings, cruz, sausage, bologna, salami, chitterlings, fatback, hot dogs, bratwurst, and packaged lunch meats. Liver and organ meats. Whole eggs and egg yolks. Chicken and turkey with skin. Fried meat.  Dairy  · Whole or 2% milk, cream,  half-and-half, and cream cheese. Whole milk cheeses. Whole-fat or sweetened yogurt. Full-fat cheeses. Nondairy creamers and whipped toppings. Processed cheese, cheese spreads, and cheese curds.  Beverages  · Alcohol. Sugar-sweetened drinks such as sodas, lemonade, and fruit drinks.  Fats and oils  · Butter, stick margarine, lard, shortening, ghee, or cruz fat. Coconut, palm kernel, and palm oils.  Sweets and desserts  · Corn syrup, sugars, honey, and molasses. Candy. Jam and jelly. Syrup. Sweetened cereals. Cookies, pies, cakes, donuts, muffins, and ice cream.  The items listed above may not be a complete list of foods and beverages you should avoid. Contact a dietitian for more information.  Summary  · Choosing the right foods helps keep your fat and cholesterol at normal levels. This can keep you from getting certain diseases.  · At meals, fill one-half of your plate with vegetables and green salads.  · Eat high-fiber foods, like whole grains, beans, apples, carrots, peas, and barley.  · Limit added sugar, saturated fats, alcohol, and fried foods.  This information is not intended to replace advice given to you by your health care provider. Make sure you discuss any questions you have with your health care provider.  Document Released: 06/18/2013 Document Revised: 08/21/2019 Document Reviewed: 09/04/2018  [employer] Patient Education © 2020 [employer]  How to Quarantine at Home  Information for Patients and Families    These instructions are for people with confirmed or suspected COVID-19 who do not need to be hospitalized and those with confirmed COVID-19 who were hospitalized and discharged to care for themselves at home.    If you were tested through the Health Department  The Health Department will monitor your wellbeing.  If it is determined that you do not need to be hospitalized and can be isolated at home, you will be monitored by staff from your local or state health department.     If you were tested  through a [facility]  You will need to monitor yourself and report changes in your symptoms to your doctor.  See the section below called Monitor Your Symptoms.    Follow these steps until a healthcare provider or local or state health department says you can return to your normal activities.    Stay home except to get medical care  • Restrict activities outside your home, except for getting medical care.   • Do not go to work, school, or public areas.   • Avoid using public transportation, ride-sharing, or taxis.    Separate yourself from other people and animals in your home  People  As much as possible, you should stay in a specific room and away from other people in your home. Also, you should use a separate bathroom, if available.    Animals  You should restrict contact with pets and other animals while you are sick with COVID-19, just like you would around other people. When possible, have another member of your household care for your animals while you are sick. If you are sick with COVID-19, avoid contact with your pet, including petting, snuggling, being kissed or licked, and sharing food. If you must care for your pet or be around animals while you are sick, wash your hands before and after you interact with pets and wear a facemask. See COVID-19 and Animals for more information.    Call ahead before visiting your doctor  If you have a medical appointment, call the healthcare provider and tell them that you have or may have COVID-19. This information will help the healthcare provider’s office take steps to keep other people from getting infected or exposed.    Wear a facemask  You should wear a facemask when you are around other people (e.g., sharing a room or vehicle) or pets and before you enter a healthcare provider’s office.     If you are not able to wear a facemask (for example, because it causes trouble breathing), then people who live with you should not stay in the same room with you, or they  should wear a facemask if they enter your room.    Cover your coughs and sneezes  • Cover your mouth and nose with a tissue when you cough or sneeze.   • Throw used tissues in a lined trash can.   • Immediately wash your hands with soap and water for at least 20 seconds or, if soap and water are not available, clean your hands with an alcohol-based hand  that contains at least 60% alcohol.    Clean your hands often  • Wash your hands often with soap and water for at least 20 seconds, especially after blowing your nose, coughing, or sneezing; going to the bathroom; and before eating or preparing food.     • If soap and water are not readily available, use an alcohol-based hand  with at least 60% alcohol, covering all surfaces of your hands and rubbing them together until they feel dry.    • Soap and water are the best option if hands are visibly dirty. Avoid touching your eyes, nose, and mouth with unwashed hands.    Avoid sharing personal household items  • You should not share dishes, drinking glasses, cups, eating utensils, towels, or bedding with other people or pets in your home.   • After using these items, they should be washed thoroughly with soap and water.    Clean all “high-touch” surfaces everyday  • High touch surfaces include counters, tabletops, doorknobs, bathroom fixtures, toilets, phones, keyboards, tablets, and bedside tables.   • Also, clean any surfaces that may have blood, stool, or body fluids on them.   • Use a household cleaning spray or wipe, according to the label instructions. Labels contain instructions for safe and effective use of the cleaning product, including precautions you should take when applying the product, such as wearing gloves and making sure you have good ventilation during use of the product.    Monitor your symptoms  • Seek prompt medical attention if your illness is worsening (e.g., difficulty breathing).   • Before seeking care, call your healthcare  provider and tell them that you have, or are being evaluated for, COVID-19.   • Put on a facemask before you enter the facility.     • These steps will help the healthcare provider’s office to keep other people in the office or waiting room from getting infected or exposed.   • Persons who are placed under active monitoring or facilitated self-monitoring should follow instructions provided by their local health department or occupational health professionals, as appropriate.  • If you have a medical emergency and need to call 911, notify the dispatch personnel that you have, or are being evaluated for COVID-19. If possible, put on a facemask before emergency medical services arrive.    Discontinuing home isolation  Patients with confirmed COVID-19 should remain under home isolation precautions until the risk of secondary transmission to others is thought to be low. The decision to discontinue home isolation precautions should be made on a case-by-case basis, in consultation with healthcare providers and state and local health departments.    The below content are for household members, intimate partners, and caregivers of a patient with symptomatic laboratory-confirmed COVID-19 or a patient under investigation:    Household members, intimate partners, and caregivers may have close contact with a person with symptomatic, laboratory-confirmed COVID-19 or a person under investigation.     Close contacts should monitor their health; they should call their healthcare provider right away if they develop symptoms suggestive of COVID-19 (e.g., fever, cough, shortness of breath)     Close contacts should also follow these recommendations:  • Make sure that you understand and can help the patient follow their healthcare provider’s instructions for medication(s) and care. You should help the patient with basic needs in the home and provide support for getting groceries, prescriptions, and other personal needs.  • Monitor the  patient’s symptoms. If the patient is getting sicker, call his or her healthcare provider and tell them that the patient has laboratory-confirmed COVID-19. This will help the healthcare provider’s office take steps to keep other people in the office or waiting room from getting infected. Ask the healthcare provider to call the local or state health department for additional guidance. If the patient has a medical emergency and you need to call 911, notify the dispatch personnel that the patient has, or is being evaluated for COVID-19.  • Household members should stay in another room or be  from the patient as much as possible. Household members should use a separate bedroom and bathroom, if available.  • Prohibit visitors who do not have an essential need to be in the home.  • Household members should care for any pets in the home. Do not handle pets or other animals while sick.  For more information, see COVID-19 and Animals.  • Make sure that shared spaces in the home have good air flow, such as by an air conditioner or an opened window, weather permitting.  • Perform hand hygiene frequently. Wash your hands often with soap and water for at least 20 seconds or use an alcohol-based hand  that contains 60 to 95% alcohol, covering all surfaces of your hands and rubbing them together until they feel dry. Soap and water should be used preferentially if hands are visibly dirty.  • Avoid touching your eyes, nose, and mouth with unwashed hands.  • The patient should wear a facemask when you are around other people. If the patient is not able to wear a facemask (for example, because it causes trouble breathing), you, as the caregiver, should wear a mask when you are in the same room as the patient.  • Wear a disposable facemask and gloves when you touch or have contact with the patient’s blood, stool, or body fluids, such as saliva, sputum, nasal mucus, vomit, or urine.   o Throw out disposable facemasks  and gloves after using them. Do not reuse.  o When removing personal protective equipment, first remove and dispose of gloves. Then, immediately clean your hands with soap and water or alcohol-based hand . Next, remove and dispose of facemask, and immediately clean your hands again with soap and water or alcohol-based hand .  • Avoid sharing household items with the patient. You should not share dishes, drinking glasses, cups, eating utensils, towels, bedding, or other items. After the patient uses these items, you should wash them thoroughly (see below “Wash laundry thoroughly”).  • Clean all “high-touch” surfaces, such as counters, tabletops, doorknobs, bathroom fixtures, toilets, phones, keyboards, tablets, and bedside tables, every day. Also, clean any surfaces that may have blood, stool, or body fluids on them.   o Use a household cleaning spray or wipe, according to the label instructions. Labels contain instructions for safe and effective use of the cleaning product including precautions you should take when applying the product, such as wearing gloves and making sure you have good ventilation during use of the product.  • Wash laundry thoroughly.   o Immediately remove and wash clothes or bedding that have blood, stool, or body fluids on them.  o Wear disposable gloves while handling soiled items and keep soiled items away from your body. Clean your hands (with soap and water or an alcohol-based hand ) immediately after removing your gloves.  o Read and follow directions on labels of laundry or clothing items and detergent. In general, using a normal laundry detergent according to washing machine instructions and dry thoroughly using the warmest temperatures recommended on the clothing label.  • Place all used disposable gloves, facemasks, and other contaminated items in a lined container before disposing of them with other household waste. Clean your hands (with soap and water or  "an alcohol-based hand ) immediately after handling these items. Soap and water should be used preferentially if hands are visibly dirty.  • Discuss any additional questions with your state or local health department or healthcare provider.    Adapted from information provided by the Centers for Disease Control and Prevention.  For more information, visit https://www.cdc.gov/coronavirus/2019-ncov/hcp/guidance-prevent-spread.htmlBMI for Adults    Body mass index (BMI) is a number that is calculated from a person's weight and height. BMI may help to estimate how much of a person's weight is composed of fat. BMI can help identify those who may be at higher risk for certain medical problems.  How is BMI used with adults?  BMI is used as a screening tool to identify possible weight problems. It is used to check whether a person is obese, overweight, healthy weight, or underweight.  How is BMI calculated?  BMI measures your weight and compares it to your height. This can be done either in English ([place] or metric measurements. Note that charts are available to help you find your BMI quickly and easily without having to do these calculations yourself.  To calculate your BMI in English ([place] measurements, your health care provider will:  1. Measure your weight in pounds (lb).  2. Multiply the number of pounds by 703.  ? For example, for a person who weighs 180 lb, multiply that number by 703, which equals 126,540.  3. Measure your height in inches (in). Then multiply that number by itself to get a measurement called \"inches squared.\"  ? For example, for a person who is 70 in tall, the \"inches squared\" measurement is 70 in x 70 in, which equals 4900 inches squared.  4. Divide the total from Step 2 (number of lb x 703) by the total from Step 3 (inches squared): 126,540 ÷ 4900 = 25.8. This is your BMI.  To calculate your BMI in metric measurements, your health care provider will:  1. Measure your weight in kilograms " "(kg).  2. Measure your height in meters (m). Then multiply that number by itself to get a measurement called \"meters squared.\"  ? For example, for a person who is 1.75 m tall, the \"meters squared\" measurement is 1.75 m x 1.75 m, which is equal to 3.1 meters squared.  3. Divide the number of kilograms (your weight) by the meters squared number. In this example: 70 ÷ 3.1 = 22.6. This is your BMI.  How is BMI interpreted?  To interpret your results, your health care provider will use BMI charts to identify whether you are underweight, normal weight, overweight, or obese. The following guidelines will be used:  · Underweight: BMI less than 18.5.  · Normal weight: BMI between 18.5 and 24.9.  · Overweight: BMI between 25 and 29.9.  · Obese: BMI of 30 and above.  Please note:  · Weight includes both fat and muscle, so someone with a muscular build, such as an athlete, may have a BMI that is higher than 24.9. In cases like these, BMI is not an accurate measure of body fat.  · To determine if excess body fat is the cause of a BMI of 25 or higher, further assessments may need to be done by a health care provider.  · BMI is usually interpreted in the same way for men and women.  Why is BMI a useful tool?  BMI is useful in two ways:  · Identifying a weight problem that may be related to a medical condition, or that may increase the risk for medical problems.  · Promoting lifestyle and diet changes in order to reach a healthy weight.  Summary  · Body mass index (BMI) is a number that is calculated from a person's weight and height.  · BMI may help to estimate how much of a person's weight is composed of fat. BMI can help identify those who may be at higher risk for certain medical problems.  · BMI can be measured using English measurements or metric measurements.  · To interpret your results, your health care provider will use BMI charts to identify whether you are underweight, normal weight, overweight, or obese.  This " information is not intended to replace advice given to you by your health care provider. Make sure you discuss any questions you have with your health care provider.  Document Released: 08/29/2005 Document Revised: 11/30/2018 Document Reviewed: 10/31/2018  [employer] Patient Education © 2020 [employer]

## 2020-07-02 NOTE — PROGRESS NOTES
Problem list     Subjective   Sofi Lazaro is a 50 y.o. female     Chief Complaint   Patient presents with   • Follow-up   Problem list:     1.  Chest pain  2.  Shortness of breath  3.  Hypertension  4.  Peripheral edema  5.  Palpitations  6.  Fibromyalgia  7.  COPD-Dr. Lazo  8.  Peripheral neuropathy  9.  Hyperlipidemia    HPI  Pt is a 51y/o female presenting for follow up after an ER visit for chest pain and hypertensive urgency. The episode occurred 2 days ago. Pt was at home playing with her kids. She experienced sudden onset anterior chest pain that radiated to the back, headache, and diaphoresis. She reports her blood pressure spiked at 220/118. She was treated with IV meds and given nitro prn and told to follow up with us.   Since then she reports persistent episodes of chest tightness follow by blood pressure spikes. Pain is relieved by nitro but quickly returns. Pt reports SOB and fatigue with these episodes. She is experiencing lower extremity swelling and orthostatic hypotension.       Current Outpatient Medications on File Prior to Visit   Medication Sig Dispense Refill   • albuterol (PROVENTIL HFA;VENTOLIN HFA) 108 (90 Base) MCG/ACT inhaler Inhale 2 puffs Every 4 (Four) Hours As Needed for Wheezing.     • amitriptyline (ELAVIL) 100 MG tablet Take 100 mg by mouth Daily.     • ANORO ELLIPTA 62.5-25 MCG/INH aerosol powder  inhaler      • aspirin 81 MG tablet Take 1 tablet by mouth Daily. 30 tablet 11   • budesonide-formoterol (SYMBICORT) 160-4.5 MCG/ACT inhaler Inhale 2 puffs 2 (Two) Times a Day.     • busPIRone (BUSPAR) 10 MG tablet Take 10 mg by mouth Daily.     • gabapentin (NEURONTIN) 600 MG tablet Take 600 mg by mouth 3 (Three) Times a Day.     • HYDROcodone-acetaminophen (NORCO)  MG per tablet TK 1 T PO Q 4 H PRN FOR PAIN  0   • HYDROcodone-acetaminophen (NORCO) 5-325 MG per tablet TK 1 T PO QHS PRN     • isosorbide mononitrate (IMDUR) 30 MG 24 hr tablet Take 0.5 tablets by mouth Daily. 30  "tablet 11   • lisinopril (PRINIVIL,ZESTRIL) 40 MG tablet Take 40 mg by mouth Daily.     • nitroglycerin (NITROSTAT) 0.4 MG SL tablet 1 under the tongue as needed for angina, may repeat q5mins for up three doses 30 tablet 3   • Pramipexole Dihydrochloride (MIRAPEX ER PO) Take  by mouth.     • raNITIdine (ZANTAC) 150 MG tablet Take 150 mg by mouth Every Night.       No current facility-administered medications on file prior to visit.        Codeine    Past Medical History:   Diagnosis Date   • Anxiety    • Chronic pain syndrome    • COPD (chronic obstructive pulmonary disease) (CMS/HCC)    • Depressive disorder    • Enlarged heart    • Fibromyalgia    • GERD (gastroesophageal reflux disease)    • Hypertension    • IBS (irritable bowel syndrome)    • Insomnia    • RLS (restless legs syndrome)        Social History     Socioeconomic History   • Marital status:      Spouse name: Not on file   • Number of children: Not on file   • Years of education: Not on file   • Highest education level: Not on file   Tobacco Use   • Smoking status: Former Smoker   • Smokeless tobacco: Never Used   Substance and Sexual Activity   • Alcohol use: No   • Drug use: No   • Sexual activity: Defer       Family History   Problem Relation Age of Onset   • Seizures Mother    • COPD Mother    • Cancer Mother    • COPD Father        Review of Systems   Constitutional: Positive for fatigue.   HENT: Positive for rhinorrhea (allergies ). Negative for congestion and sore throat.    Eyes: Negative for visual disturbance.   Respiratory: Positive for chest tightness and shortness of breath (w/ increased activity ).    Cardiovascular: Positive for chest pain (Pt c/o chest pressure in the center of her chest. States \"feels like there's something there, like it's hard, stuck.\" ), palpitations (occasionally ) and leg swelling (BLE edema, goes down overnight ).   Gastrointestinal: Positive for constipation (back and forth), diarrhea (back and forth ) " "and nausea (sometimes ). Negative for abdominal pain, blood in stool and vomiting.   Endocrine: Positive for heat intolerance. Negative for cold intolerance.   Genitourinary: Negative for difficulty urinating, dysuria, frequency, hematuria and urgency.   Musculoskeletal: Positive for arthralgias and back pain. Negative for neck pain.   Skin: Negative for rash and wound.   Allergic/Immunologic: Positive for environmental allergies (seasonal ). Negative for food allergies.   Neurological: Positive for dizziness (constant ), weakness (sometimes feels weak all over ) and numbness (arms, hands, and feet ). Negative for syncope, light-headedness and headaches.   Hematological: Bruises/bleeds easily (both ).   Psychiatric/Behavioral: Negative for sleep disturbance (Denies SoA at HS ).       Objective   Vitals:    07/02/20 1349   BP: 157/79   Pulse: 83   SpO2: 97%   Weight: 129 kg (283 lb 12.8 oz)   Height: 154.9 cm (61\")      /79   Pulse 83   Ht 154.9 cm (61\")   Wt 129 kg (283 lb 12.8 oz)   SpO2 97%   BMI 53.62 kg/m²     Lab Results (most recent)     None          Physical Exam   Constitutional: She is oriented to person, place, and time. She appears well-developed and well-nourished. No distress.   HENT:   Head: Normocephalic and atraumatic.   Eyes: Pupils are equal, round, and reactive to light. EOM are normal.   Cardiovascular: Normal rate, regular rhythm, normal heart sounds and intact distal pulses. Exam reveals no gallop and no friction rub.   No murmur heard.  Pulmonary/Chest: Effort normal and breath sounds normal. No respiratory distress. She has no wheezes. She has no rales. She exhibits no tenderness.   Abdominal: Soft. She exhibits no distension and no mass. There is no tenderness. There is no rebound and no guarding.   Musculoskeletal: Normal range of motion. She exhibits no edema.   Neurological: She is alert and oriented to person, place, and time. No cranial nerve deficit.   Skin: Skin is warm " and dry. No rash noted. No erythema. No pallor.   Psychiatric: She has a normal mood and affect. Her behavior is normal.       Procedure     ECG 12 Lead  Date/Time: 7/2/2020 1:41 PM  Performed by: Jj Temple PA  Authorized by: Jj Temple PA   Comparison: compared with previous ECG from 5/14/2019  Comments: Normal sinus rhythm with rate of 58bpm, non specific ST changes                Assessment/Plan     Problems Addressed this Visit        Cardiovascular and Mediastinum    Palpitations - Primary    Relevant Orders    ECG 12 Lead    Adult Transthoracic Echo Complete W/ Cont if Necessary Per Protocol    Stress Test With Myocardial Perfusion One Day    Essential hypertension    Relevant Orders    ECG 12 Lead    Adult Transthoracic Echo Complete W/ Cont if Necessary Per Protocol    Stress Test With Myocardial Perfusion One Day    Hyperlipidemia    Relevant Orders    ECG 12 Lead    Adult Transthoracic Echo Complete W/ Cont if Necessary Per Protocol    Stress Test With Myocardial Perfusion One Day       Respiratory    Shortness of breath    Relevant Orders    ECG 12 Lead    Adult Transthoracic Echo Complete W/ Cont if Necessary Per Protocol    Stress Test With Myocardial Perfusion One Day       Nervous and Auditory    Chest pain    Relevant Orders    ECG 12 Lead    Adult Transthoracic Echo Complete W/ Cont if Necessary Per Protocol    Stress Test With Myocardial Perfusion One Day          Chest pain - history is suspicious for angina, plan is to order   Hypertension -   Palpitations - cardiac event monitor revealed no significant dysrhythmias, however given persistence of palpitations during her episodes of chest pain, further evaluation is warrented.             Sofi Lazaro  reports that she has quit smoking. She has never used smokeless tobacco.        Patient's Body mass index is 53.62 kg/m². BMI is above normal parameters. Recommendations include: educational material.       Electronically signed  by:

## 2020-08-03 DIAGNOSIS — I10 ESSENTIAL HYPERTENSION: Primary | ICD-10-CM

## 2020-08-03 RX ORDER — LISINOPRIL 40 MG/1
40 TABLET ORAL DAILY
Qty: 30 TABLET | Refills: 5 | Status: SHIPPED | OUTPATIENT
Start: 2020-08-03

## 2023-01-04 ENCOUNTER — TELEPHONE (OUTPATIENT)
Dept: CARDIOLOGY | Facility: CLINIC | Age: 53
End: 2023-01-04
Payer: MEDICARE

## 2023-01-04 NOTE — TELEPHONE ENCOUNTER
Patient called in wanting to be seen today for Chest pain worsening in [place] . She describes pain as squeezing and is having SOB. I advised patient to go to the ER and be evaluated .       Noelle CULLEN

## 2023-02-02 ENCOUNTER — OFFICE VISIT (OUTPATIENT)
Dept: FAMILY MEDICINE CLINIC | Facility: CLINIC | Age: 53
End: 2023-02-02
Payer: MEDICARE

## 2023-02-02 ENCOUNTER — PATIENT ROUNDING (BHMG ONLY) (OUTPATIENT)
Dept: FAMILY MEDICINE CLINIC | Facility: CLINIC | Age: 53
End: 2023-02-02
Payer: MEDICARE

## 2023-02-02 VITALS
SYSTOLIC BLOOD PRESSURE: 110 MMHG | BODY MASS INDEX: 53.28 KG/M2 | RESPIRATION RATE: 20 BRPM | HEART RATE: 74 BPM | DIASTOLIC BLOOD PRESSURE: 64 MMHG | TEMPERATURE: 97.5 F | OXYGEN SATURATION: 100 % | WEIGHT: 282.2 LBS | HEIGHT: 61 IN

## 2023-02-02 DIAGNOSIS — Z00.00 ANNUAL PHYSICAL EXAM: Primary | ICD-10-CM

## 2023-02-02 DIAGNOSIS — M79.7 FIBROMYALGIA: ICD-10-CM

## 2023-02-02 DIAGNOSIS — I10 ESSENTIAL HYPERTENSION: ICD-10-CM

## 2023-02-02 DIAGNOSIS — Z12.11 SCREEN FOR COLON CANCER: ICD-10-CM

## 2023-02-02 DIAGNOSIS — Z12.31 BREAST CANCER SCREENING BY MAMMOGRAM: ICD-10-CM

## 2023-02-02 DIAGNOSIS — J44.9 CHRONIC OBSTRUCTIVE PULMONARY DISEASE, UNSPECIFIED COPD TYPE: ICD-10-CM

## 2023-02-02 PROCEDURE — 99204 OFFICE O/P NEW MOD 45 MIN: CPT | Performed by: NURSE PRACTITIONER

## 2023-02-02 RX ORDER — PHENTERMINE HYDROCHLORIDE 37.5 MG/1
37.5 TABLET ORAL
COMMUNITY

## 2023-02-02 RX ORDER — HYDROCODONE BITARTRATE AND ACETAMINOPHEN 7.5; 325 MG/1; MG/1
1 TABLET ORAL 3 TIMES DAILY
COMMUNITY

## 2023-02-02 RX ORDER — PREGABALIN 150 MG/1
150 CAPSULE ORAL 3 TIMES DAILY
COMMUNITY

## 2023-02-02 NOTE — PROGRESS NOTES
February 2, 2023    Hello, may I speak with TAMI PATTERSON?    My name is Ronn Truong     I am  with [facility]  [address]  [facility] [address] [phone].    Before we get started may I verify your date of birth? 1970    I am calling to officially welcome you to our practice and ask about your recent visit. Is this a good time to talk?     YES - I spoke with this patient at check out    Tell me about your visit with us. What things went well?      Patient said that her visit with our office today was great. She said her wait time was quick and Leti took her time and listened to her.       We're always looking for ways to make our patients' experiences even better. Do you have recommendations on ways we may improve?      NO    Overall were you satisfied with your first visit to our practice?     YES       I appreciate you taking the time to speak with me today. Is there anything else I can do for you?     NO      Thank you, and have a great day.

## 2023-02-02 NOTE — PROGRESS NOTES
Chief Complaint  Establish Care (Continued care of HTN)    Subjective        TAMI PATTERSON presents to [facility] to establish care (continued care of htn/gerd).    Hypertension  This is a chronic problem. Episode onset: 9 years or longer. The problem is unchanged. The problem is controlled. Associated symptoms include anxiety, blurred vision, malaise/fatigue, peripheral edema and shortness of breath. Pertinent negatives include no chest pain, headaches, neck pain, orthopnea, palpitations, PND or sweats. Agents associated with hypertension include NSAIDs. Risk factors for coronary artery disease include dyslipidemia, obesity, post-menopausal state, sedentary lifestyle and stress. Past treatments include ACE inhibitors and calcium channel blockers. Current antihypertension treatment includes ACE inhibitors and calcium channel blockers. The current treatment provides significant improvement. Compliance problems include diet, exercise and psychosocial issues.    Heartburn  She complains of heartburn. She reports no abdominal pain, no belching, no chest pain, no choking, no coughing, no dysphagia, no early satiety, no globus sensation, no hoarse voice, no nausea, no sore throat, no stridor, no tooth decay, no water brash or no wheezing. This is a chronic problem. The current episode started more than 1 year ago. The problem occurs occasionally. The problem has been unchanged. The heartburn wakes her from sleep. The heartburn limits her activity. The heartburn changes with position. The symptoms are aggravated by certain foods. Associated symptoms include fatigue. Risk factors include obesity and lack of exercise. She has tried a histamine-2 antagonist for the symptoms. The treatment provided significant relief.   COPD  She complains of shortness of breath. There is no chest tightness, cough, difficulty breathing, frequent throat clearing, hemoptysis, hoarse voice, sputum production or  "wheezing. This is a chronic problem. The current episode started more than 1 year ago. Associated symptoms include heartburn and malaise/fatigue. Pertinent negatives include no chest pain, headaches, PND, sore throat or sweats. Her symptoms are aggravated by emotional stress and minimal activity. Improvement on treatment: pt has est pulmonologist. Her past medical history is significant for COPD.     Objective   Vital Signs:  /64 (BP Location: Left arm, Patient Position: Sitting, Cuff Size: Large Adult)   Pulse 74   Temp 97.5 °F (36.4 °C) (Temporal)   Resp 20   Ht 154.9 cm (61\")   Wt 128 kg (282 lb 3.2 oz)   SpO2 100%   BMI 53.32 kg/m²   Estimated body mass index is 53.32 kg/m² as calculated from the following:    Height as of this encounter: 154.9 cm (61\").    Weight as of this encounter: 128 kg (282 lb 3.2 oz).       Class 3 Severe Obesity (BMI >=40). Obesity-related health conditions include the following: hypertension, dyslipidemias and GERD. Obesity is unchanged. BMI is is above average; BMI management plan is completed. We discussed portion control and increasing exercise.    Physical Exam  Vitals and nursing note reviewed. Exam conducted with a chaperone present.   Constitutional:       General: She is awake.      Appearance: Normal appearance. She is morbidly obese.   HENT:      Head: Normocephalic.      Right Ear: Hearing, tympanic membrane, ear canal and external ear normal.      Left Ear: Hearing, tympanic membrane, ear canal and external ear normal.      Nose: Nose normal.      Mouth/Throat:      Lips: Pink.      Mouth: Mucous membranes are moist.      Pharynx: Oropharynx is clear.   Eyes:      General: Lids are normal.      Extraocular Movements: Extraocular movements intact.      Conjunctiva/sclera: Conjunctivae normal.   Cardiovascular:      Rate and Rhythm: Normal rate and regular rhythm.      Pulses: Normal pulses.      Heart sounds: Normal heart sounds.   Pulmonary:      Effort: " Pulmonary effort is normal.      Breath sounds: Normal breath sounds.   Abdominal:      General: Abdomen is protuberant. Bowel sounds are normal.      Palpations: Abdomen is soft.      Tenderness: There is no abdominal tenderness.   Musculoskeletal:      Cervical back: Normal range of motion and neck supple.      Right lower leg: No edema.      Left lower leg: No edema.   Skin:     General: Skin is warm and dry.      Capillary Refill: Capillary refill takes less than 2 seconds.   Neurological:      Mental Status: She is alert and oriented to person, place, and time.      Cranial Nerves: Cranial nerves 2-12 are intact.      Sensory: Sensation is intact.      Motor: Motor function is intact.      Coordination: Coordination is intact.      Gait: Gait is intact.      Deep Tendon Reflexes: Reflexes are normal and symmetric.   Psychiatric:         Attention and Perception: Attention and perception normal.         Mood and Affect: Mood is anxious. Affect is flat.         Speech: Speech is rapid and pressured.         Behavior: Behavior normal. Behavior is cooperative.         Thought Content: Thought content normal.        Result Review :  The following data was reviewed by: ALICE Hernandez on 02/02/2023:                   Assessment and Plan   Diagnoses and all orders for this visit:    1. Annual physical exam (Primary)  -     CBC w AUTO Differential; Future  -     Comprehensive metabolic panel; Future  -     Lipid panel; Future  -     TSH; Future  -     Hepatitis C antibody; Future  -     Ambulatory Referral to Obstetrics / Gynecology    2. Essential hypertension  Assessment & Plan:  Hypertension is unchanged.  Continue current treatment regimen.  Blood pressure will be reassessed at the next regular appointment.      3. Chronic obstructive pulmonary disease, unspecified COPD type (HCC)  -     Ambulatory Referral to Pulmonology    4. Fibromyalgia  Comments:  Pt sees pain management in [place], [place]    5. Breast  cancer screening by mammogram  -     Mammo Screening Bilateral With CAD; Future    6. Screen for colon cancer  -     Cologuard - Stool, Per Rectum; Future         The preventive exam has been reviewed in detail.  The patient has been fully counseled on preventative guidelines for vaccines, cancer screenings, and other health maintenance needs.   The patient has been counseled on guidelines for maintaining a lifestyle to promote good health and to minimize chronic diseases.  The patient has been assisted with scheduling these healthcare procedures for the coming year and given a written document of health maintenance and anticipatory guidance for age with the AVS.    I spent 30 minutes caring for TAMI on this date of service. This time includes time spent by me in the following activities:preparing for the visit, reviewing tests, obtaining and/or reviewing a separately obtained history, performing a medically appropriate examination and/or evaluation , counseling and educating the patient/family/caregiver, ordering medications, tests, or procedures, referring and communicating with other health care professionals , documenting information in the medical record, independently interpreting results and communicating that information with the patient/family/caregiver and care coordination     Follow Up   Return in about 3 months (around 5/2/2023) for Medicare Wellness.  Patient was given instructions and counseling regarding her condition or for health maintenance advice. Please see specific information pulled into the AVS if appropriate.       This document has been electronically signed by ALICE Hernandez  February 2, 2023 21:26 EST

## 2023-02-03 NOTE — PATIENT INSTRUCTIONS
"Hypertension, Adult  High blood pressure (hypertension) is when the force of blood pumping through the arteries is too strong. The arteries are the blood vessels that carry blood from the heart throughout the body. Hypertension forces the heart to work harder to pump blood and may cause arteries to become narrow or stiff. Untreated or uncontrolled hypertension can cause a heart attack, heart failure, a stroke, kidney disease, and other problems.  A blood pressure reading consists of a higher number over a lower number. Ideally, your blood pressure should be below 120/80. The first (\"top\") number is called the systolic pressure. It is a measure of the pressure in your arteries as your heart beats. The second (\"bottom\") number is called the diastolic pressure. It is a measure of the pressure in your arteries as the heart relaxes.  What are the causes?  The exact cause of this condition is not known. There are some conditions that result in or are related to high blood pressure.  What increases the risk?  Some risk factors for high blood pressure are under your control. The following factors may make you more likely to develop this condition:  Smoking.  Having type 2 diabetes mellitus, high cholesterol, or both.  Not getting enough exercise or physical activity.  Being overweight.  Having too much fat, sugar, calories, or salt (sodium) in your diet.  Drinking too much alcohol.  Some risk factors for high blood pressure may be difficult or impossible to change. Some of these factors include:  Having chronic kidney disease.  Having a family history of high blood pressure.  Age. Risk increases with age.  Race. You may be at higher risk if you are .  Gender. Men are at higher risk than women before age 45. After age 65, women are at higher risk than men.  Having obstructive sleep apnea.  Stress.  What are the signs or symptoms?  High blood pressure may not cause symptoms. Very high blood pressure " (hypertensive crisis) may cause:  Headache.  Anxiety.  Shortness of breath.  Nosebleed.  Nausea and vomiting.  Vision changes.  Severe chest pain.  Seizures.  How is this diagnosed?  This condition is diagnosed by measuring your blood pressure while you are seated, with your arm resting on a flat surface, your legs uncrossed, and your feet flat on the floor. The cuff of the blood pressure monitor will be placed directly against the skin of your upper arm at the level of your heart. It should be measured at least twice using the same arm. Certain conditions can cause a difference in blood pressure between your right and left arms.  Certain factors can cause blood pressure readings to be lower or higher than normal for a short period of time:  When your blood pressure is higher when you are in a health care provider's office than when you are at home, this is called white coat hypertension. Most people with this condition do not need medicines.  When your blood pressure is higher at home than when you are in a health care provider's office, this is called masked hypertension. Most people with this condition may need medicines to control blood pressure.  If you have a high blood pressure reading during one visit or you have normal blood pressure with other risk factors, you may be asked to:  Return on a different day to have your blood pressure checked again.  Monitor your blood pressure at home for 1 week or longer.  If you are diagnosed with hypertension, you may have other blood or imaging tests to help your health care provider understand your overall risk for other conditions.  How is this treated?  This condition is treated by making healthy lifestyle changes, such as eating healthy foods, exercising more, and reducing your alcohol intake. Your health care provider may prescribe medicine if lifestyle changes are not enough to get your blood pressure under control, and if:  Your systolic blood pressure is above  130.  Your diastolic blood pressure is above 80.  Your personal target blood pressure may vary depending on your medical conditions, your age, and other factors.  Follow these instructions at home:  Eating and drinking    Eat a diet that is high in fiber and potassium, and low in sodium, added sugar, and fat. An example eating plan is called the DASH (Dietary Approaches to Stop Hypertension) diet. To eat this way:  Eat plenty of fresh fruits and vegetables. Try to fill one half of your plate at each meal with fruits and vegetables.  Eat whole grains, such as whole-wheat pasta, brown rice, or whole-grain bread. Fill about one fourth of your plate with whole grains.  Eat or drink low-fat dairy products, such as skim milk or low-fat yogurt.  Avoid fatty cuts of meat, processed or cured meats, and poultry with skin. Fill about one fourth of your plate with lean proteins, such as fish, chicken without skin, beans, eggs, or tofu.  Avoid pre-made and processed foods. These tend to be higher in sodium, added sugar, and fat.  Reduce your daily sodium intake. Most people with hypertension should eat less than 1,500 mg of sodium a day.  Do not drink alcohol if:  Your health care provider tells you not to drink.  You are pregnant, may be pregnant, or are planning to become pregnant.  If you drink alcohol:  Limit how much you use to:  0-1 drink a day for women.  0-2 drinks a day for men.  Be aware of how much alcohol is in your drink. In the [place] one drink equals one 12 oz bottle of beer (355 mL), one 5 oz glass of wine (148 mL), or one 1½ oz glass of hard liquor (44 mL).  Lifestyle    Work with your health care provider to maintain a healthy body weight or to lose weight. Ask what an ideal weight is for you.  Get at least 30 minutes of exercise most days of the week. Activities may include walking, swimming, or biking.  Include exercise to strengthen your muscles (resistance exercise), such as Pilates or lifting weights, as  part of your weekly exercise routine. Try to do these types of exercises for 30 minutes at least 3 days a week.  Do not use any products that contain nicotine or tobacco, such as cigarettes, e-cigarettes, and chewing tobacco. If you need help quitting, ask your health care provider.  Monitor your blood pressure at home as told by your health care provider.  Keep all follow-up visits as told by your health care provider. This is important.  Medicines  Take over-the-counter and prescription medicines only as told by your health care provider. Follow directions carefully. Blood pressure medicines must be taken as prescribed.  Do not skip doses of blood pressure medicine. Doing this puts you at risk for problems and can make the medicine less effective.  Ask your health care provider about side effects or reactions to medicines that you should watch for.  Contact a health care provider if you:  Think you are having a reaction to a medicine you are taking.  Have headaches that keep coming back (recurring).  Feel dizzy.  Have swelling in your ankles.  Have trouble with your vision.  Get help right away if you:  Develop a severe headache or confusion.  Have unusual weakness or numbness.  Feel faint.  Have severe pain in your chest or abdomen.  Vomit repeatedly.  Have trouble breathing.  Summary  Hypertension is when the force of blood pumping through your arteries is too strong. If this condition is not controlled, it may put you at risk for serious complications.  Your personal target blood pressure may vary depending on your medical conditions, your age, and other factors. For most people, a normal blood pressure is less than 120/80.  Hypertension is treated with lifestyle changes, medicines, or a combination of both. Lifestyle changes include losing weight, eating a healthy, low-sodium diet, exercising more, and limiting alcohol.  This information is not intended to replace advice given to you by your health care  provider. Make sure you discuss any questions you have with your health care provider.  Document Revised: 08/28/2019 Document Reviewed: 08/28/2019  Elsevier Patient Education © 2022 [employer] Gastroesophageal Reflux Disease, Adult  Gastroesophageal reflux (MATTHEW) happens when acid from the stomach flows up into the tube that connects the mouth and the stomach (esophagus). Normally, food travels down the esophagus and stays in the stomach to be digested. With MATTHEW, food and stomach acid sometimes move back up into the esophagus.  You may have a disease called gastroesophageal reflux disease (GERD) if the reflux:  Happens often.  Causes frequent or very bad symptoms.  Causes problems such as damage to the esophagus.  When this happens, the esophagus becomes sore and swollen. Over time, GERD can make small holes (ulcers) in the lining of the esophagus.  What are the causes?  This condition is caused by a problem with the muscle between the esophagus and the stomach. When this muscle is weak or not normal, it does not close properly to keep food and acid from coming back up from the stomach.  The muscle can be weak because of:  Tobacco use.  Pregnancy.  Having a certain type of hernia (hiatal hernia).  Alcohol use.  Certain foods and drinks, such as coffee, chocolate, onions, and peppermint.  What increases the risk?  Being overweight.  Having a disease that affects your connective tissue.  Taking NSAIDs, such a ibuprofen.  What are the signs or symptoms?  Heartburn.  Difficult or painful swallowing.  The feeling of having a lump in the throat.  A bitter taste in the mouth.  Bad breath.  Having a lot of saliva.  Having an upset or bloated stomach.  Burping.  Chest pain. Different conditions can cause chest pain. Make sure you see your doctor if you have chest pain.  Shortness of breath or wheezing.  A long-term cough or a cough at night.  Wearing away of the surface of teeth (tooth enamel).  Weight loss.  How is this  treated?  Making changes to your diet.  Taking medicine.  Having surgery.  Treatment will depend on how bad your symptoms are.  Follow these instructions at home:  Eating and drinking    Follow a diet as told by your doctor. You may need to avoid foods and drinks such as:  Coffee and tea, with or without caffeine.  Drinks that contain alcohol.  Energy drinks and sports drinks.  Bubbly (carbonated) drinks or sodas.  Chocolate and cocoa.  Peppermint and mint flavorings.  Garlic and onions.  Horseradish.  Spicy and acidic foods. These include peppers, chili powder, forde powder, vinegar, hot sauces, and BBQ sauce.  Citrus fruit juices and citrus fruits, such as oranges, efren, and limes.  Tomato-based foods. These include red sauce, chili, salsa, and pizza with red sauce.  Fried and fatty foods. These include donuts, french fries, potato chips, and high-fat dressings.  High-fat meats. These include hot dogs, rib eye steak, sausage, ham, and cruz.  High-fat dairy items, such as whole milk, butter, and cream cheese.  Eat small meals often. Avoid eating large meals.  Avoid drinking large amounts of liquid with your meals.  Avoid eating meals during the 2-3 hours before bedtime.  Avoid lying down right after you eat.  Do not exercise right after you eat.  Lifestyle    Do not smoke or use any products that contain nicotine or tobacco. If you need help quitting, ask your doctor.  Try to lower your stress. If you need help doing this, ask your doctor.  If you are overweight, lose an amount of weight that is healthy for you. Ask your doctor about a safe weight loss goal.  General instructions  Pay attention to any changes in your symptoms.  Take over-the-counter and prescription medicines only as told by your doctor.  Do not take aspirin, ibuprofen, or other NSAIDs unless your doctor says it is okay.  Wear loose clothes. Do not wear anything tight around your waist.  Raise (elevate) the head of your bed about 6 inches (15  cm). You may need to use a wedge to do this.  Avoid bending over if this makes your symptoms worse.  Keep all follow-up visits.  Contact a doctor if:  You have new symptoms.  You lose weight and you do not know why.  You have trouble swallowing or it hurts to swallow.  You have wheezing or a cough that keeps happening.  You have a hoarse voice.  Your symptoms do not get better with treatment.  Get help right away if:  You have sudden pain in your arms, neck, jaw, teeth, or back.  You suddenly feel sweaty, dizzy, or light-headed.  You have chest pain or shortness of breath.  You vomit and the vomit is green, yellow, or black, or it looks like blood or coffee grounds.  You faint.  Your poop (stool) is red, bloody, or black.  You cannot swallow, drink, or eat.  These symptoms may represent a serious problem that is an emergency. Do not wait to see if the symptoms will go away. Get medical help right away. Call your local emergency services (911 in the [place] Do not drive yourself to the hospital.  Summary  If a person has gastroesophageal reflux disease (GERD), food and stomach acid move back up into the esophagus and cause symptoms or problems such as damage to the esophagus.  Treatment will depend on how bad your symptoms are.  Follow a diet as told by your doctor.  Take all medicines only as told by your doctor.  This information is not intended to replace advice given to you by your health care provider. Make sure you discuss any questions you have with your health care provider.  Document Revised: 06/28/2021 Document Reviewed: 06/28/2021  [employer] Patient Education © 2022 [employer] Inc[employer]

## 2023-02-23 ENCOUNTER — OFFICE VISIT (OUTPATIENT)
Dept: CARDIOLOGY | Facility: CLINIC | Age: 53
End: 2023-02-23
Payer: MEDICARE

## 2023-02-23 VITALS
BODY MASS INDEX: 52.3 KG/M2 | HEART RATE: 74 BPM | DIASTOLIC BLOOD PRESSURE: 88 MMHG | SYSTOLIC BLOOD PRESSURE: 150 MMHG | HEIGHT: 61 IN | WEIGHT: 277 LBS

## 2023-02-23 DIAGNOSIS — R00.2 PALPITATIONS: ICD-10-CM

## 2023-02-23 DIAGNOSIS — R07.9 CHEST PAIN, UNSPECIFIED TYPE: ICD-10-CM

## 2023-02-23 DIAGNOSIS — R06.02 SHORTNESS OF BREATH: Primary | ICD-10-CM

## 2023-02-23 PROCEDURE — 93000 ELECTROCARDIOGRAM COMPLETE: CPT | Performed by: PHYSICIAN ASSISTANT

## 2023-02-23 PROCEDURE — 99214 OFFICE O/P EST MOD 30 MIN: CPT | Performed by: PHYSICIAN ASSISTANT

## 2023-02-23 RX ORDER — NITROGLYCERIN 0.4 MG/1
TABLET SUBLINGUAL
Qty: 25 TABLET | Refills: 11 | Status: SHIPPED | OUTPATIENT
Start: 2023-02-23

## 2023-02-23 RX ORDER — NITROGLYCERIN 0.4 MG/1
TABLET SUBLINGUAL
Qty: 30 TABLET | Refills: 3 | Status: SHIPPED | OUTPATIENT
Start: 2023-02-23 | End: 2023-02-23

## 2023-02-23 NOTE — PROGRESS NOTES
Subjective   TAMI PATTERSON is a 52 y.o. female     Chief Complaint   Patient presents with   • Shortness of Breath     Last seen in 2020   • Chest Pain      Problem list    1.  Chest pain  2.  Shortness of breath  3.  Hypertension  4.  Peripheral edema  5.  Palpitations  6.  Fibromyalgia  7.  COPD-Dr. Lazo  8.  Peripheral neuropathy  9.  Hyperlipidemia    HPI    Patient is a 52-year-old female who presents to the office to be evaluated.  She was last seen in 2020 and at that point apparently had testing ordered but I do not see that it was performed.    Recently, patient started having issues in regards to discomfort.  She is feeling a pressure in the substernal region as if something is pressing on her chest.  This appears to be random.  She does not describe it being been up to go to the hospital but apparently has been close causing her to consider it.  She does describes shortness of breath when exerting or doing activity.  She also complains of a degree of PND.    Patient also complains of palpitations.  She does not complain of dizziness, presyncope or syncope.  Otherwise patient is stable.                Current Outpatient Medications on File Prior to Visit   Medication Sig Dispense Refill   • albuterol (PROVENTIL HFA;VENTOLIN HFA) 108 (90 Base) MCG/ACT inhaler Inhale 2 puffs Every 4 (Four) Hours As Needed for Wheezing.     • amLODIPine (NORVASC) 5 MG tablet Take 1 tablet by mouth Daily. 30 tablet 11   • ANORO ELLIPTA 62.5-25 MCG/INH aerosol powder  inhaler      • aspirin 81 MG tablet Take 1 tablet by mouth Daily. 30 tablet 11   • budesonide-formoterol (SYMBICORT) 160-4.5 MCG/ACT inhaler Inhale 2 puffs 2 (Two) Times a Day.     • HYDROcodone-acetaminophen (NORCO) 7.5-325 MG per tablet Take 1 tablet by mouth 3 (Three) Times a Day.     • lisinopril (PRINIVIL,ZESTRIL) 40 MG tablet Take 1 tablet by mouth Daily. Indications: High Blood Pressure Disorder 30 tablet 5   • phentermine (ADIPEX-P) 37.5 MG tablet  Take 37.5 mg by mouth Every Morning Before Breakfast.     • Pramipexole Dihydrochloride (MIRAPEX ER PO) Take  by mouth.     • pregabalin (LYRICA) 150 MG capsule Take 150 mg by mouth 3 (Three) Times a Day.     • raNITIdine (ZANTAC) 150 MG tablet Take 150 mg by mouth Every Night.     • [DISCONTINUED] nitroglycerin (NITROSTAT) 0.4 MG SL tablet 1 under the tongue as needed for angina, may repeat q5mins for up three doses 30 tablet 3     No current facility-administered medications on file prior to visit.       Codeine    Past Medical History:   Diagnosis Date   • Anxiety    • Arthritis    • Asthma    • Cancer (HCC)     Breast but it been in remission for 15 yrs   • Chronic pain syndrome    • COPD (chronic obstructive pulmonary disease) (HCC)    • Depressive disorder    • Enlarged heart    • Fibromyalgia    • Fibromyalgia, primary    • GERD (gastroesophageal reflux disease)    • Headache    • Hypertension    • IBS (irritable bowel syndrome)    • Insomnia    • Low back pain    • Obesity    • RLS (restless legs syndrome)    • Visual impairment        Social History     Socioeconomic History   • Marital status:    Tobacco Use   • Smoking status: Former     Packs/day: 1.00     Years: 10.00     Pack years: 10.00     Types: Cigarettes     Quit date: 2003     Years since quittin.0   • Smokeless tobacco: Never   • Tobacco comments:     Started at a young age stopped over 20 yrs ago   Substance and Sexual Activity   • Alcohol use: No   • Drug use: No   • Sexual activity: Yes     Partners: Male     Birth control/protection: None, Hysterectomy       Family History   Problem Relation Age of Onset   • Seizures Mother    • COPD Mother    • Cancer Mother    • Anxiety disorder Mother    • Arthritis Mother    • COPD Father    • Arthritis Father    • Asthma Father    • Cancer Father    • Hyperlipidemia Father        Review of Systems   Constitutional: Positive for fatigue. Negative for chills and fever.   HENT: Negative.   "Negative for congestion and sinus pressure.    Respiratory: Positive for shortness of breath. Negative for chest tightness.    Cardiovascular: Positive for chest pain, palpitations and leg swelling.   Gastrointestinal: Negative.  Negative for abdominal pain, blood in stool, constipation, diarrhea, nausea and vomiting.   Genitourinary: Negative for dysuria, frequency, hematuria and urgency.   Neurological: Positive for numbness (left side of body).   Psychiatric/Behavioral: Positive for sleep disturbance (wakes with soa and cp).       Objective   Vitals:    02/23/23 1445   BP: 150/88   BP Location: Left arm   Patient Position: Sitting   Pulse: 74   Weight: 126 kg (277 lb)   Height: 154.9 cm (61\")      /88 (BP Location: Left arm, Patient Position: Sitting)   Pulse 74   Ht 154.9 cm (61\")   Wt 126 kg (277 lb)   BMI 52.34 kg/m²     Lab Results (most recent)     None          Physical Exam  Vitals and nursing note reviewed.   Constitutional:       General: She is not in acute distress.     Appearance: Normal appearance. She is well-developed.   HENT:      Head: Normocephalic and atraumatic.   Eyes:      General: No scleral icterus.        Right eye: No discharge.         Left eye: No discharge.      Conjunctiva/sclera: Conjunctivae normal.   Neck:      Vascular: No carotid bruit.   Cardiovascular:      Rate and Rhythm: Normal rate and regular rhythm.      Heart sounds: Normal heart sounds. No murmur heard.    No friction rub. No gallop.   Pulmonary:      Effort: Pulmonary effort is normal. No respiratory distress.      Breath sounds: Normal breath sounds. No wheezing or rales.   Chest:      Chest wall: No tenderness.   Musculoskeletal:      Right lower leg: No edema.      Left lower leg: No edema.   Skin:     General: Skin is warm and dry.      Coloration: Skin is not pale.      Findings: No erythema or rash.   Neurological:      Mental Status: She is alert and oriented to person, place, and time.      Cranial " Nerves: No cranial nerve deficit.   Psychiatric:         Behavior: Behavior normal.         Procedure     ECG 12 Lead    Date/Time: 2/23/2023 2:49 PM  Performed by: Jj Temple PA  Authorized by: Jj Temple PA   Comparison: compared with previous ECG from 7/2/2020  Comments: EKG demonstrates sinus rhythm at 69 bpm, low voltage, no acute ST changes               Assessment & Plan     Problems Addressed this Visit        Cardiac and Vasculature    Palpitations    Relevant Orders    Adult Transthoracic Echo Complete W/ Cont if Necessary Per Protocol    Stress Test With Myocardial Perfusion One Day    Cardiac Event Monitor    Chest pain    Relevant Orders    Adult Transthoracic Echo Complete W/ Cont if Necessary Per Protocol    Stress Test With Myocardial Perfusion One Day    Cardiac Event Monitor       Pulmonary and Pneumonias    Shortness of breath - Primary    Relevant Orders    Adult Transthoracic Echo Complete W/ Cont if Necessary Per Protocol    Stress Test With Myocardial Perfusion One Day    Cardiac Event Monitor   Diagnoses       Codes Comments    Shortness of breath    -  Primary ICD-10-CM: R06.02  ICD-9-CM: 786.05     Chest pain, unspecified type     ICD-10-CM: R07.9  ICD-9-CM: 786.50     Palpitations     ICD-10-CM: R00.2  ICD-9-CM: 785.1           Recommendation  1.  Patient is a 52-year-old male who presents back to the office to be evaluated.  Because of her symptoms as above, I would like to schedule testing to evaluate.    2.  With chest pressure described above, will rule out ischemia as a contributing factor we will schedule for nuclear stress testing to evaluate further.    3.  Echocardiogram to assess LV systolic and diastolic performance, valvular structures etc.    4.  I would like to schedule a 2-week event monitor to evaluate for any arrhythmic substrate.    5.  We are prescribing nitroglycerin as needed for chest pain.  Any chest pain, noticeable nitroglycerin, I recommend her  going to the ER.  Follow-up with primary as scheduled.           TAMI PATTERSON  reports that she quit smoking about 20 years ago. Her smoking use included cigarettes. She has a 10.00 pack-year smoking history. She has never used smokeless tobacco..    Patient did not bring med list or medicine bottles to appointment, med list has been reviewed and updated based on patient's knowledge of their meds.     Electronically signed by:

## 2023-02-27 ENCOUNTER — TELEPHONE (OUTPATIENT)
Dept: CARDIOLOGY | Facility: CLINIC | Age: 53
End: 2023-02-27
Payer: MEDICARE

## 2023-02-27 DIAGNOSIS — R60.0 EDEMA LEG: Primary | ICD-10-CM

## 2023-02-27 RX ORDER — FUROSEMIDE 20 MG/1
20 TABLET ORAL DAILY
Qty: 30 TABLET | Refills: 11 | Status: SHIPPED | OUTPATIENT
Start: 2023-02-27

## 2023-02-27 NOTE — TELEPHONE ENCOUNTER
Caller: TAMI PATTERSON    Relationship: Self    Best call back number: [phone]    What is the best time to reach you: ANY    Who are you requesting to speak with (clinical staff, provider,  specific staff member): CLINICAL    Do you know the name of the person who called: TAMI PATTERSON    What was the call regarding: PT HAVING A REACTION TO THE LATEX ON THE DEVICE, WANTS TO KNOW ANY OPTIONS     Do you require a callback: YES

## 2023-02-27 NOTE — TELEPHONE ENCOUNTER
Jj Temple PA Sawyers, Emily  Caller: Unspecified (Today, 11:55 AM)  Take 20 mg of furosemide daily.  Check a BMP in 1 week

## 2023-02-27 NOTE — TELEPHONE ENCOUNTER
"Per chart review monitor was ordered on 2/23/23 x 2 weeks.         I called pt, I asked if she has a latex allergy as we don't have that listed. She stated she spoke w/ someone and was given sensitive strips and that she had issues w/ that too.   (I added Latex as an allergy). She stated she has been wearing monitor since Sat. Pt states she's so raw she can't do another day.   I advised pt to put everything back in the box and send it in.     Pt stated she also told FO staff about how she's been swelling and would like that addressed. States it's mainly in her hands and feet, but also \"all over.\" States the swelling does not go down overnight or when elevated. Pt denies increase in salt intake or changes to meds. Pt denies any hx of issues w/ her kidneys. Pt is unsure how BP runs at home.   "

## 2023-03-01 ENCOUNTER — TELEPHONE (OUTPATIENT)
Dept: CARDIOLOGY | Facility: CLINIC | Age: 53
End: 2023-03-01
Payer: MEDICARE

## 2023-03-01 NOTE — TELEPHONE ENCOUNTER
Patient seen in office last week for symptoms with testing scheduled. Explained scheduling has made several attempts to call patient and mailed a letter yesterday. She was given the number to scheduling. She was advised to proceed to the ER with any active chest pain or concerns to have immediate evaluation and treatment.

## 2023-03-01 NOTE — TELEPHONE ENCOUNTER
Caller: MATILDE PATTERSON    Relationship: Emergency Contact    Best call back number: [phone]    What is the best time to reach you: ANY    Who are you requesting to speak with (clinical staff, provider,  specific staff member): CLINICAL     Do you know the name of the person who called: MATILDE    What was the call regarding: SPOUSE IS REPORTING THAT PT HAD A PRETTY BAD EPISODE LAST NIGHT OF CHEST PAIN, HEADACHES AND DROWSINESS, PT IS NOT HAVING SYMPTOMS CURRENTLY, WANTS A FOLLOW UP ON WHAT THE NEXT STEPS SHOULD BE.     Do you require a callback:YES

## 2023-03-14 ENCOUNTER — TELEPHONE (OUTPATIENT)
Dept: CARDIOLOGY | Facility: CLINIC | Age: 53
End: 2023-03-14
Payer: MEDICARE

## 2023-03-14 NOTE — TELEPHONE ENCOUNTER
"Caller: TAMI GOLDMAN \"Tami Goldman\"    Relationship to patient: Self    Best call back number: [phone]    Patient is needing: PATIENT CALLED IN AND STATED ABOUT A WEEK AGO SHE WOKE UP FROM A COLD SWEAT VERY CLAMMY, CHEST HURTING, AND NUMB ON HER LEFT SIDE DOWN HER LEG. STATED THAT SINCE SHE HAS CONTINUED TO BE NUMB ON HER LEFT SIDE WITH HER FINGERS BEING DISCOLORED. SHE HAS ALSO HAD HEART FLUTTERS WITH SHORTNESS OF BREATH AND HAS CONTINUED TO OFF AND ON FOR THE LAST WEEK. PATIENT WOULD LIKE A CALL BACK TO ADVISE ON WHAT SHE NEEDS TO DO FROM HERE.       UNABLE TO WARM TRANSFER.     "

## 2023-04-06 ENCOUNTER — TELEPHONE (OUTPATIENT)
Dept: CARDIOLOGY | Facility: CLINIC | Age: 53
End: 2023-04-06

## 2023-05-04 ENCOUNTER — TELEPHONE (OUTPATIENT)
Dept: FAMILY MEDICINE CLINIC | Facility: CLINIC | Age: 53
End: 2023-05-04
Payer: MEDICARE

## 2023-05-04 NOTE — TELEPHONE ENCOUNTER
Attempted to contact patient to reschedule previous missed appointment on 5/4/23    HUB okay to reschedule appointment at patients earliest convenience.    Yolanda Vargas, [employer] Rep

## 2023-05-26 ENCOUNTER — TELEPHONE (OUTPATIENT)
Dept: FAMILY MEDICINE CLINIC | Facility: CLINIC | Age: 53
End: 2023-05-26
Payer: MEDICARE

## 2023-06-09 ENCOUNTER — TELEPHONE (OUTPATIENT)
Dept: FAMILY MEDICINE CLINIC | Facility: CLINIC | Age: 53
End: 2023-06-09
Payer: MEDICARE

## 2023-06-19 ENCOUNTER — TELEPHONE (OUTPATIENT)
Dept: CARDIOLOGY | Facility: CLINIC | Age: 53
End: 2023-06-19
Payer: MEDICARE

## 2023-06-19 NOTE — TELEPHONE ENCOUNTER
For the HUB to read to pt:   MONITOR  Called patient to notify of no acute findings or abnormalities. Keep follow up as scheduled. If you have any problem between now and then give our office a call.     ----- Message from RICARDA Sutton sent at 6/19/2023  3:31 PM EDT -----  Routine follow-up

## 2023-07-27 ENCOUNTER — TELEPHONE (OUTPATIENT)
Dept: FAMILY MEDICINE CLINIC | Facility: CLINIC | Age: 53
End: 2023-07-27
Payer: MEDICARE

## 2023-11-14 ENCOUNTER — TELEPHONE (OUTPATIENT)
Dept: FAMILY MEDICINE CLINIC | Facility: CLINIC | Age: 53
End: 2023-11-14
Payer: MEDICARE

## 2023-11-14 NOTE — TELEPHONE ENCOUNTER
"Relay     \"I was calling regarding if you got your Mammogram rescheduled. If you have not, please do so at your earliest convenience \"                 "

## 2024-02-01 ENCOUNTER — TELEPHONE (OUTPATIENT)
Dept: FAMILY MEDICINE CLINIC | Facility: CLINIC | Age: 54
End: 2024-02-01
Payer: MEDICARE

## 2024-02-01 NOTE — TELEPHONE ENCOUNTER
"Relay     \"We were reaching out to schedule your subsequent medicare wellness, \"    Hub okay to relay and schedule             "

## 2024-02-07 ENCOUNTER — TELEPHONE (OUTPATIENT)
Dept: FAMILY MEDICINE CLINIC | Facility: CLINIC | Age: 54
End: 2024-02-07
Payer: MEDICARE

## 2024-02-07 NOTE — TELEPHONE ENCOUNTER
"Relay     \"We were reaching out to see if you wanted to get your Mammogram rescheduled\"                 "

## 2024-10-10 NOTE — TELEPHONE ENCOUNTER
Called patient, she states episode was on 3/1/23 as previously reported. Testing scheduled 3/27 and will see about getting moved sooner. She reports she still has problems with her speech and did have slurred speech after episode but no facial drawing. Explained symptoms are comparible to those of a stroke. She stated she will proceed to ER to be evaluated for possible stroke.    Does the Patient have a central line?  Yes: See Invasive (Oncology) Lines Flowsheet for [facility] documentation.

## 2025-07-07 NOTE — TELEPHONE ENCOUNTER
"  Caller: TAMI GOLDMAN \"Tami Goldman\"    Relationship: Self    Best call back number: [phone]    What is the best time to reach you: ANY    Who are you requesting to speak with (clinical staff, provider,  specific staff member): CLINICAL    What was the call regarding: [facility] CALLED INTO GET A FOLLOW UP APPT SCHEDULED FOR PT. THEY STATED SHE IS GOING TO NEED TO SPEAK ABOUT LONG TERM BLOOD THINNERS    Do you require a callback: YES            "
decreased weight-shifting ability

## 2025-07-30 ENCOUNTER — TELEPHONE (OUTPATIENT)
Dept: CARDIOLOGY | Facility: CLINIC | Age: 55
End: 2025-07-30
Payer: MEDICAID

## 2025-07-30 NOTE — TELEPHONE ENCOUNTER
Relay      Received a cardiac clearance request from Dr. Lo, however pt has not been seen since 02/23/2023, will need to call pt for an appt to process CC.